# Patient Record
Sex: MALE | Race: WHITE | NOT HISPANIC OR LATINO | Employment: FULL TIME | URBAN - METROPOLITAN AREA
[De-identification: names, ages, dates, MRNs, and addresses within clinical notes are randomized per-mention and may not be internally consistent; named-entity substitution may affect disease eponyms.]

---

## 2019-11-20 ENCOUNTER — APPOINTMENT (OUTPATIENT)
Dept: URGENT CARE | Age: 59
End: 2019-11-20
Payer: OTHER MISCELLANEOUS

## 2019-11-20 PROCEDURE — G0382 LEV 3 HOSP TYPE B ED VISIT: HCPCS | Performed by: PHYSICIAN ASSISTANT

## 2019-11-20 PROCEDURE — 99283 EMERGENCY DEPT VISIT LOW MDM: CPT | Performed by: PHYSICIAN ASSISTANT

## 2022-03-02 ENCOUNTER — APPOINTMENT (EMERGENCY)
Dept: RADIOLOGY | Facility: HOSPITAL | Age: 62
DRG: 247 | End: 2022-03-02
Payer: COMMERCIAL

## 2022-03-02 ENCOUNTER — HOSPITAL ENCOUNTER (INPATIENT)
Facility: HOSPITAL | Age: 62
LOS: 2 days | Discharge: HOME | DRG: 247 | End: 2022-03-04
Attending: EMERGENCY MEDICINE | Admitting: INTERNAL MEDICINE
Payer: COMMERCIAL

## 2022-03-02 DIAGNOSIS — Z95.5 S/P DRUG ELUTING CORONARY STENT PLACEMENT: ICD-10-CM

## 2022-03-02 DIAGNOSIS — I25.10 CAD (CORONARY ARTERY DISEASE): ICD-10-CM

## 2022-03-02 DIAGNOSIS — I21.19 ACUTE MI, INFERIOR WALL (HCC): Primary | ICD-10-CM

## 2022-03-02 DIAGNOSIS — I21.3 STEMI (ST ELEVATION MYOCARDIAL INFARCTION) (HCC): ICD-10-CM

## 2022-03-02 PROBLEM — E87.6 HYPOKALEMIA: Status: ACTIVE | Noted: 2022-03-02

## 2022-03-02 PROBLEM — R73.9 HYPERGLYCEMIA: Status: ACTIVE | Noted: 2022-03-02

## 2022-03-02 PROBLEM — D72.829 LEUKOCYTOSIS: Status: ACTIVE | Noted: 2022-03-02

## 2022-03-02 PROBLEM — E78.5 HLD (HYPERLIPIDEMIA): Status: ACTIVE | Noted: 2022-03-02

## 2022-03-02 PROBLEM — I10 HYPERTENSION: Status: ACTIVE | Noted: 2022-03-02

## 2022-03-02 PROBLEM — I21.11 ST ELEVATION MYOCARDIAL INFARCTION INVOLVING RIGHT CORONARY ARTERY (HCC): Status: ACTIVE | Noted: 2022-03-02

## 2022-03-02 LAB
2HR DELTA HS TROPONIN: 60 NG/L
ALBUMIN SERPL BCP-MCNC: 3.9 G/DL (ref 3.5–5)
ALP SERPL-CCNC: 88 U/L (ref 46–116)
ALT SERPL W P-5'-P-CCNC: 33 U/L (ref 12–78)
ANION GAP SERPL CALCULATED.3IONS-SCNC: 17 MMOL/L (ref 4–13)
APTT PPP: 31 SECONDS (ref 23–37)
AST SERPL W P-5'-P-CCNC: 19 U/L (ref 5–45)
BASOPHILS # BLD AUTO: 0.06 THOUSANDS/ΜL (ref 0–0.1)
BASOPHILS NFR BLD AUTO: 0 % (ref 0–1)
BILIRUB SERPL-MCNC: 0.93 MG/DL (ref 0.2–1)
BUN SERPL-MCNC: 16 MG/DL (ref 5–25)
CALCIUM SERPL-MCNC: 8.9 MG/DL (ref 8.3–10.1)
CARDIAC TROPONIN I PNL SERPL HS: 12 NG/L
CARDIAC TROPONIN I PNL SERPL HS: 72 NG/L
CHLORIDE SERPL-SCNC: 102 MMOL/L (ref 100–108)
CHOLEST SERPL-MCNC: 159 MG/DL
CO2 SERPL-SCNC: 20 MMOL/L (ref 21–32)
CREAT SERPL-MCNC: 1.17 MG/DL (ref 0.6–1.3)
EOSINOPHIL # BLD AUTO: 0.02 THOUSAND/ΜL (ref 0–0.61)
EOSINOPHIL NFR BLD AUTO: 0 % (ref 0–6)
ERYTHROCYTE [DISTWIDTH] IN BLOOD BY AUTOMATED COUNT: 12.2 % (ref 11.6–15.1)
GFR SERPL CREATININE-BSD FRML MDRD: 66 ML/MIN/1.73SQ M
GLUCOSE SERPL-MCNC: 165 MG/DL (ref 65–140)
HCT VFR BLD AUTO: 48.3 % (ref 36.5–49.3)
HDLC SERPL-MCNC: 45 MG/DL
HGB BLD-MCNC: 17 G/DL (ref 12–17)
IMM GRANULOCYTES # BLD AUTO: 0.05 THOUSAND/UL (ref 0–0.2)
IMM GRANULOCYTES NFR BLD AUTO: 0 % (ref 0–2)
INR PPP: 1 (ref 0.84–1.19)
KCT BLD-ACNC: 265 SEC (ref 89–137)
KCT BLD-ACNC: 285 SEC (ref 89–137)
LDLC SERPL CALC-MCNC: 95 MG/DL (ref 0–100)
LIPASE SERPL-CCNC: 38 U/L (ref 73–393)
LYMPHOCYTES # BLD AUTO: 2.36 THOUSANDS/ΜL (ref 0.6–4.47)
LYMPHOCYTES NFR BLD AUTO: 17 % (ref 14–44)
MAGNESIUM SERPL-MCNC: 2.1 MG/DL (ref 1.6–2.6)
MCH RBC QN AUTO: 29.6 PG (ref 26.8–34.3)
MCHC RBC AUTO-ENTMCNC: 35.2 G/DL (ref 31.4–37.4)
MCV RBC AUTO: 84 FL (ref 82–98)
MONOCYTES # BLD AUTO: 0.71 THOUSAND/ΜL (ref 0.17–1.22)
MONOCYTES NFR BLD AUTO: 5 % (ref 4–12)
NEUTROPHILS # BLD AUTO: 10.69 THOUSANDS/ΜL (ref 1.85–7.62)
NEUTS SEG NFR BLD AUTO: 78 % (ref 43–75)
NONHDLC SERPL-MCNC: 114 MG/DL
NRBC BLD AUTO-RTO: 0 /100 WBCS
PLATELET # BLD AUTO: 355 THOUSANDS/UL (ref 149–390)
PMV BLD AUTO: 9.6 FL (ref 8.9–12.7)
POTASSIUM SERPL-SCNC: 3.3 MMOL/L (ref 3.5–5.3)
PROT SERPL-MCNC: 8.2 G/DL (ref 6.4–8.2)
PROTHROMBIN TIME: 13.2 SECONDS (ref 11.6–14.5)
RBC # BLD AUTO: 5.74 MILLION/UL (ref 3.88–5.62)
SODIUM SERPL-SCNC: 139 MMOL/L (ref 136–145)
SPECIMEN SOURCE: ABNORMAL
SPECIMEN SOURCE: ABNORMAL
TRIGL SERPL-MCNC: 96 MG/DL
WBC # BLD AUTO: 13.89 THOUSAND/UL (ref 4.31–10.16)

## 2022-03-02 PROCEDURE — 99285 EMERGENCY DEPT VISIT HI MDM: CPT

## 2022-03-02 PROCEDURE — 83735 ASSAY OF MAGNESIUM: CPT

## 2022-03-02 PROCEDURE — NC001 PR NO CHARGE: Performed by: INTERNAL MEDICINE

## 2022-03-02 PROCEDURE — 99153 MOD SED SAME PHYS/QHP EA: CPT | Performed by: INTERNAL MEDICINE

## 2022-03-02 PROCEDURE — 83690 ASSAY OF LIPASE: CPT | Performed by: EMERGENCY MEDICINE

## 2022-03-02 PROCEDURE — C1894 INTRO/SHEATH, NON-LASER: HCPCS | Performed by: INTERNAL MEDICINE

## 2022-03-02 PROCEDURE — 71045 X-RAY EXAM CHEST 1 VIEW: CPT

## 2022-03-02 PROCEDURE — C1769 GUIDE WIRE: HCPCS | Performed by: INTERNAL MEDICINE

## 2022-03-02 PROCEDURE — B2111ZZ FLUOROSCOPY OF MULTIPLE CORONARY ARTERIES USING LOW OSMOLAR CONTRAST: ICD-10-PCS | Performed by: INTERNAL MEDICINE

## 2022-03-02 PROCEDURE — 85025 COMPLETE CBC W/AUTO DIFF WBC: CPT | Performed by: EMERGENCY MEDICINE

## 2022-03-02 PROCEDURE — 85730 THROMBOPLASTIN TIME PARTIAL: CPT

## 2022-03-02 PROCEDURE — 85610 PROTHROMBIN TIME: CPT

## 2022-03-02 PROCEDURE — 027034Z DILATION OF CORONARY ARTERY, ONE ARTERY WITH DRUG-ELUTING INTRALUMINAL DEVICE, PERCUTANEOUS APPROACH: ICD-10-PCS | Performed by: INTERNAL MEDICINE

## 2022-03-02 PROCEDURE — 99223 1ST HOSP IP/OBS HIGH 75: CPT | Performed by: NURSE PRACTITIONER

## 2022-03-02 PROCEDURE — 80053 COMPREHEN METABOLIC PANEL: CPT | Performed by: EMERGENCY MEDICINE

## 2022-03-02 PROCEDURE — C1725 CATH, TRANSLUMIN NON-LASER: HCPCS | Performed by: INTERNAL MEDICINE

## 2022-03-02 PROCEDURE — 96365 THER/PROPH/DIAG IV INF INIT: CPT

## 2022-03-02 PROCEDURE — 99291 CRITICAL CARE FIRST HOUR: CPT | Performed by: EMERGENCY MEDICINE

## 2022-03-02 PROCEDURE — 92941 PRQ TRLML REVSC TOT OCCL AMI: CPT | Performed by: INTERNAL MEDICINE

## 2022-03-02 PROCEDURE — 99152 MOD SED SAME PHYS/QHP 5/>YRS: CPT | Performed by: INTERNAL MEDICINE

## 2022-03-02 PROCEDURE — C1887 CATHETER, GUIDING: HCPCS | Performed by: INTERNAL MEDICINE

## 2022-03-02 PROCEDURE — 84484 ASSAY OF TROPONIN QUANT: CPT | Performed by: EMERGENCY MEDICINE

## 2022-03-02 PROCEDURE — 93454 CORONARY ARTERY ANGIO S&I: CPT | Performed by: INTERNAL MEDICINE

## 2022-03-02 PROCEDURE — 85347 COAGULATION TIME ACTIVATED: CPT

## 2022-03-02 PROCEDURE — 36415 COLL VENOUS BLD VENIPUNCTURE: CPT

## 2022-03-02 PROCEDURE — 96375 TX/PRO/DX INJ NEW DRUG ADDON: CPT

## 2022-03-02 PROCEDURE — 80061 LIPID PANEL: CPT

## 2022-03-02 PROCEDURE — C1874 STENT, COATED/COV W/DEL SYS: HCPCS | Performed by: INTERNAL MEDICINE

## 2022-03-02 PROCEDURE — 93005 ELECTROCARDIOGRAM TRACING: CPT

## 2022-03-02 DEVICE — XIENCE SKYPOINT™ EVEROLIMUS ELUTING CORONARY STENT SYSTEM 4.00 MM X 23 MM / RAPID-EXCHANGE
Type: IMPLANTABLE DEVICE | Site: CORONARY | Status: FUNCTIONAL
Brand: XIENCE SKYPOINT™

## 2022-03-02 RX ORDER — ASPIRIN 81 MG/1
81 TABLET, CHEWABLE ORAL DAILY
Status: DISCONTINUED | OUTPATIENT
Start: 2022-03-03 | End: 2022-03-04 | Stop reason: HOSPADM

## 2022-03-02 RX ORDER — ASPIRIN 81 MG/1
324 TABLET, CHEWABLE ORAL ONCE
Status: COMPLETED | OUTPATIENT
Start: 2022-03-02 | End: 2022-03-02

## 2022-03-02 RX ORDER — MIDAZOLAM HYDROCHLORIDE 2 MG/2ML
INJECTION, SOLUTION INTRAMUSCULAR; INTRAVENOUS AS NEEDED
Status: DISCONTINUED | OUTPATIENT
Start: 2022-03-02 | End: 2022-03-02 | Stop reason: HOSPADM

## 2022-03-02 RX ORDER — HEPARIN SODIUM 5000 [USP'U]/ML
5000 INJECTION, SOLUTION INTRAVENOUS; SUBCUTANEOUS EVERY 8 HOURS SCHEDULED
Status: DISCONTINUED | OUTPATIENT
Start: 2022-03-03 | End: 2022-03-04 | Stop reason: HOSPADM

## 2022-03-02 RX ORDER — LISINOPRIL 40 MG/1
40 TABLET ORAL DAILY
COMMUNITY
End: 2022-03-10 | Stop reason: SDUPTHER

## 2022-03-02 RX ORDER — POTASSIUM CHLORIDE 20 MEQ/1
40 TABLET, EXTENDED RELEASE ORAL ONCE
Status: COMPLETED | OUTPATIENT
Start: 2022-03-02 | End: 2022-03-02

## 2022-03-02 RX ORDER — NITROGLYCERIN 20 MG/100ML
INJECTION INTRAVENOUS AS NEEDED
Status: DISCONTINUED | OUTPATIENT
Start: 2022-03-02 | End: 2022-03-02 | Stop reason: HOSPADM

## 2022-03-02 RX ORDER — ONDANSETRON 2 MG/ML
4 INJECTION INTRAMUSCULAR; INTRAVENOUS ONCE
Status: COMPLETED | OUTPATIENT
Start: 2022-03-02 | End: 2022-03-02

## 2022-03-02 RX ORDER — HEPARIN SODIUM 1000 [USP'U]/ML
INJECTION, SOLUTION INTRAVENOUS; SUBCUTANEOUS AS NEEDED
Status: DISCONTINUED | OUTPATIENT
Start: 2022-03-02 | End: 2022-03-02 | Stop reason: HOSPADM

## 2022-03-02 RX ORDER — HEPARIN SODIUM 1000 [USP'U]/ML
4000 INJECTION, SOLUTION INTRAVENOUS; SUBCUTANEOUS ONCE
Status: COMPLETED | OUTPATIENT
Start: 2022-03-02 | End: 2022-03-02

## 2022-03-02 RX ORDER — ASPIRIN 81 MG/1
324 TABLET, CHEWABLE ORAL ONCE
Status: DISCONTINUED | OUTPATIENT
Start: 2022-03-02 | End: 2022-03-02

## 2022-03-02 RX ORDER — POTASSIUM CHLORIDE 14.9 MG/ML
20 INJECTION INTRAVENOUS ONCE
Status: COMPLETED | OUTPATIENT
Start: 2022-03-02 | End: 2022-03-03

## 2022-03-02 RX ORDER — VERAPAMIL HCL 2.5 MG/ML
AMPUL (ML) INTRAVENOUS AS NEEDED
Status: DISCONTINUED | OUTPATIENT
Start: 2022-03-02 | End: 2022-03-02 | Stop reason: HOSPADM

## 2022-03-02 RX ORDER — NITROGLYCERIN 0.4 MG/1
0.4 TABLET SUBLINGUAL
Status: DISCONTINUED | OUTPATIENT
Start: 2022-03-02 | End: 2022-03-04 | Stop reason: HOSPADM

## 2022-03-02 RX ORDER — METOPROLOL TARTRATE 5 MG/5ML
INJECTION INTRAVENOUS AS NEEDED
Status: DISCONTINUED | OUTPATIENT
Start: 2022-03-02 | End: 2022-03-02 | Stop reason: HOSPADM

## 2022-03-02 RX ORDER — SODIUM CHLORIDE 9 MG/ML
100 INJECTION, SOLUTION INTRAVENOUS CONTINUOUS
Status: DISCONTINUED | OUTPATIENT
Start: 2022-03-02 | End: 2022-03-03 | Stop reason: ALTCHOICE

## 2022-03-02 RX ORDER — ASPIRIN 81 MG/1
TABLET, CHEWABLE ORAL
Status: COMPLETED
Start: 2022-03-02 | End: 2022-03-02

## 2022-03-02 RX ORDER — FENTANYL CITRATE 50 UG/ML
INJECTION, SOLUTION INTRAMUSCULAR; INTRAVENOUS AS NEEDED
Status: DISCONTINUED | OUTPATIENT
Start: 2022-03-02 | End: 2022-03-02 | Stop reason: HOSPADM

## 2022-03-02 RX ORDER — LISINOPRIL 10 MG/1
10 TABLET ORAL DAILY
COMMUNITY
End: 2022-03-04 | Stop reason: HOSPADM

## 2022-03-02 RX ORDER — LIDOCAINE HYDROCHLORIDE 10 MG/ML
INJECTION, SOLUTION EPIDURAL; INFILTRATION; INTRACAUDAL; PERINEURAL AS NEEDED
Status: DISCONTINUED | OUTPATIENT
Start: 2022-03-02 | End: 2022-03-02 | Stop reason: HOSPADM

## 2022-03-02 RX ORDER — ONDANSETRON 2 MG/ML
INJECTION INTRAMUSCULAR; INTRAVENOUS
Status: COMPLETED
Start: 2022-03-02 | End: 2022-03-02

## 2022-03-02 RX ORDER — SODIUM CHLORIDE 9 MG/ML
3 INJECTION INTRAVENOUS
Status: DISCONTINUED | OUTPATIENT
Start: 2022-03-02 | End: 2022-03-04 | Stop reason: HOSPADM

## 2022-03-02 RX ORDER — ATORVASTATIN CALCIUM 40 MG/1
80 TABLET, FILM COATED ORAL EVERY EVENING
Status: DISCONTINUED | OUTPATIENT
Start: 2022-03-02 | End: 2022-03-04 | Stop reason: HOSPADM

## 2022-03-02 RX ADMIN — NITROGLYCERIN 1 INCH: 20 OINTMENT TOPICAL at 20:42

## 2022-03-02 RX ADMIN — TICAGRELOR 180 MG: 90 TABLET ORAL at 20:14

## 2022-03-02 RX ADMIN — POTASSIUM CHLORIDE 20 MEQ: 200 INJECTION, SOLUTION INTRAVENOUS at 23:35

## 2022-03-02 RX ADMIN — NITROGLYCERIN 0.4 MG: 0.4 TABLET SUBLINGUAL at 20:21

## 2022-03-02 RX ADMIN — NITROGLYCERIN 0.4 MG: 0.4 TABLET SUBLINGUAL at 20:33

## 2022-03-02 RX ADMIN — ONDANSETRON: 2 INJECTION INTRAMUSCULAR; INTRAVENOUS at 20:07

## 2022-03-02 RX ADMIN — NITROGLYCERIN 0.4 MG: 0.4 TABLET SUBLINGUAL at 20:10

## 2022-03-02 RX ADMIN — ATORVASTATIN CALCIUM 80 MG: 40 TABLET, FILM COATED ORAL at 23:17

## 2022-03-02 RX ADMIN — HEPARIN SODIUM 4000 UNITS: 1000 INJECTION INTRAVENOUS; SUBCUTANEOUS at 20:16

## 2022-03-02 RX ADMIN — ASPIRIN 324 MG: 81 TABLET, CHEWABLE ORAL at 20:07

## 2022-03-02 RX ADMIN — POTASSIUM CHLORIDE 40 MEQ: 1500 TABLET, EXTENDED RELEASE ORAL at 23:17

## 2022-03-02 NOTE — LETTER
[place]  [facility] [address]  Dept: [phone]    March 4, 2022     Patient: Arielle Wilson   YOB: 1960   Date of Visit: 3/2/2022       To Whom it May Concern:    Dennise Pena is under my professional care  He was seen in the hospital from 3/2/2022   to 03/04/22  He may return to work on 3/11/2022 without limitations  If you have any questions or concerns, please don't hesitate to call        Sincerely,        ISRAEL Mike

## 2022-03-03 ENCOUNTER — APPOINTMENT (INPATIENT)
Dept: NON INVASIVE DIAGNOSTICS | Facility: HOSPITAL | Age: 62
DRG: 247 | End: 2022-03-03
Payer: COMMERCIAL

## 2022-03-03 LAB
4HR DELTA HS TROPONIN: 316 NG/L
ANION GAP SERPL CALCULATED.3IONS-SCNC: 13 MMOL/L (ref 4–13)
ANION GAP SERPL CALCULATED.3IONS-SCNC: 9 MMOL/L (ref 4–13)
AORTIC ROOT: 3.5 CM
APICAL FOUR CHAMBER EJECTION FRACTION: 75 %
ASCENDING AORTA: 3.1 CM (ref 2.13–3.18)
BASOPHILS # BLD AUTO: 0.04 THOUSANDS/ΜL (ref 0–0.1)
BASOPHILS NFR BLD AUTO: 0 % (ref 0–1)
BUN SERPL-MCNC: 13 MG/DL (ref 5–25)
BUN SERPL-MCNC: 14 MG/DL (ref 5–25)
CALCIUM SERPL-MCNC: 8.2 MG/DL (ref 8.3–10.1)
CALCIUM SERPL-MCNC: 8.2 MG/DL (ref 8.3–10.1)
CARDIAC TROPONIN I PNL SERPL HS: 328 NG/L
CHLORIDE SERPL-SCNC: 108 MMOL/L (ref 100–108)
CHLORIDE SERPL-SCNC: 108 MMOL/L (ref 100–108)
CO2 SERPL-SCNC: 21 MMOL/L (ref 21–32)
CO2 SERPL-SCNC: 22 MMOL/L (ref 21–32)
CREAT SERPL-MCNC: 0.77 MG/DL (ref 0.6–1.3)
CREAT SERPL-MCNC: 0.84 MG/DL (ref 0.6–1.3)
E WAVE DECELERATION TIME: 268 MS
EOSINOPHIL # BLD AUTO: 0.04 THOUSAND/ΜL (ref 0–0.61)
EOSINOPHIL NFR BLD AUTO: 0 % (ref 0–6)
ERYTHROCYTE [DISTWIDTH] IN BLOOD BY AUTOMATED COUNT: 12.2 % (ref 11.6–15.1)
EST. AVERAGE GLUCOSE BLD GHB EST-MCNC: 114 MG/DL
FRACTIONAL SHORTENING: 35 % (ref 28–44)
GFR SERPL CREATININE-BSD FRML MDRD: 94 ML/MIN/1.73SQ M
GFR SERPL CREATININE-BSD FRML MDRD: 97 ML/MIN/1.73SQ M
GLUCOSE SERPL-MCNC: 101 MG/DL (ref 65–140)
GLUCOSE SERPL-MCNC: 109 MG/DL (ref 65–140)
HBA1C MFR BLD: 5.6 %
HCT VFR BLD AUTO: 42.1 % (ref 36.5–49.3)
HGB BLD-MCNC: 15.3 G/DL (ref 12–17)
IMM GRANULOCYTES # BLD AUTO: 0.08 THOUSAND/UL (ref 0–0.2)
IMM GRANULOCYTES NFR BLD AUTO: 1 % (ref 0–2)
INTERVENTRICULAR SEPTUM IN DIASTOLE (PARASTERNAL SHORT AXIS VIEW): 0.9 CM
INTERVENTRICULAR SEPTUM: 0.9 CM (ref 0.55–1.04)
LAAS-AP2: 13.2 CM2
LAAS-AP4: 12.7 CM2
LEFT ATRIUM SIZE: 3.5 CM
LEFT INTERNAL DIMENSION IN SYSTOLE: 3.5 CM (ref 3.71–5.62)
LEFT VENTRICULAR INTERNAL DIMENSION IN DIASTOLE: 5.4 CM (ref 6.16–9.17)
LEFT VENTRICULAR POSTERIOR WALL IN END DIASTOLE: 0.8 CM (ref 0.54–1.02)
LEFT VENTRICULAR STROKE VOLUME: 93 ML
LVSV (TEICH): 93 ML
LYMPHOCYTES # BLD AUTO: 2.58 THOUSANDS/ΜL (ref 0.6–4.47)
LYMPHOCYTES NFR BLD AUTO: 21 % (ref 14–44)
MAGNESIUM SERPL-MCNC: 2.2 MG/DL (ref 1.6–2.6)
MCH RBC QN AUTO: 30.8 PG (ref 26.8–34.3)
MCHC RBC AUTO-ENTMCNC: 36.3 G/DL (ref 31.4–37.4)
MCV RBC AUTO: 85 FL (ref 82–98)
MONOCYTES # BLD AUTO: 1.3 THOUSAND/ΜL (ref 0.17–1.22)
MONOCYTES NFR BLD AUTO: 11 % (ref 4–12)
MV E'TISSUE VEL-SEP: 8 CM/S
MV PEAK A VEL: 0.93 M/S
MV PEAK E VEL: 77 CM/S
MV STENOSIS PRESSURE HALF TIME: 78 MS
MV VALVE AREA P 1/2 METHOD: 2.82 CM2
NEUTROPHILS # BLD AUTO: 8.37 THOUSANDS/ΜL (ref 1.85–7.62)
NEUTS SEG NFR BLD AUTO: 67 % (ref 43–75)
NRBC BLD AUTO-RTO: 0 /100 WBCS
PLATELET # BLD AUTO: 299 THOUSANDS/UL (ref 149–390)
PMV BLD AUTO: 9.4 FL (ref 8.9–12.7)
POTASSIUM SERPL-SCNC: 3.7 MMOL/L (ref 3.5–5.3)
POTASSIUM SERPL-SCNC: 3.9 MMOL/L (ref 3.5–5.3)
RBC # BLD AUTO: 4.97 MILLION/UL (ref 3.88–5.62)
RIGHT ATRIUM AREA SYSTOLE A4C: 15.1 CM2
RIGHT VENTRICLE ID DIMENSION: 4.1 CM
SL CV LEFT ATRIUM LENGTH A2C: 4.4 CM
SL CV LV EF: 65
SL CV PED ECHO LEFT VENTRICLE DIASTOLIC VOLUME (MOD BIPLANE) 2D: 144 ML
SL CV PED ECHO LEFT VENTRICLE SYSTOLIC VOLUME (MOD BIPLANE) 2D: 51 ML
SODIUM SERPL-SCNC: 139 MMOL/L (ref 136–145)
SODIUM SERPL-SCNC: 142 MMOL/L (ref 136–145)
TR MAX PG: 17 MMHG
TR PEAK VELOCITY: 2.1 M/S
TRICUSPID VALVE PEAK REGURGITATION VELOCITY: 2.06 M/S
WBC # BLD AUTO: 12.41 THOUSAND/UL (ref 4.31–10.16)
Z-SCORE OF ASCENDING AORTA: 1.67
Z-SCORE OF INTERVENTRICULAR SEPTUM IN END DIASTOLE: 0.85
Z-SCORE OF LEFT VENTRICULAR DIMENSION IN END DIASTOLE: -3.3
Z-SCORE OF LEFT VENTRICULAR DIMENSION IN END SYSTOLE: -2.1
Z-SCORE OF LEFT VENTRICULAR POSTERIOR WALL IN END DIASTOLE: 0.15

## 2022-03-03 PROCEDURE — 85025 COMPLETE CBC W/AUTO DIFF WBC: CPT | Performed by: NURSE PRACTITIONER

## 2022-03-03 PROCEDURE — 83735 ASSAY OF MAGNESIUM: CPT | Performed by: NURSE PRACTITIONER

## 2022-03-03 PROCEDURE — 99232 SBSQ HOSP IP/OBS MODERATE 35: CPT | Performed by: INTERNAL MEDICINE

## 2022-03-03 PROCEDURE — 93306 TTE W/DOPPLER COMPLETE: CPT | Performed by: INTERNAL MEDICINE

## 2022-03-03 PROCEDURE — 80048 BASIC METABOLIC PNL TOTAL CA: CPT | Performed by: INTERNAL MEDICINE

## 2022-03-03 PROCEDURE — 83036 HEMOGLOBIN GLYCOSYLATED A1C: CPT | Performed by: NURSE PRACTITIONER

## 2022-03-03 PROCEDURE — 80048 BASIC METABOLIC PNL TOTAL CA: CPT | Performed by: NURSE PRACTITIONER

## 2022-03-03 PROCEDURE — 99233 SBSQ HOSP IP/OBS HIGH 50: CPT | Performed by: INTERNAL MEDICINE

## 2022-03-03 PROCEDURE — 93005 ELECTROCARDIOGRAM TRACING: CPT

## 2022-03-03 PROCEDURE — 93306 TTE W/DOPPLER COMPLETE: CPT

## 2022-03-03 RX ORDER — ONDANSETRON 2 MG/ML
4 INJECTION INTRAMUSCULAR; INTRAVENOUS EVERY 6 HOURS PRN
Status: DISCONTINUED | OUTPATIENT
Start: 2022-03-03 | End: 2022-03-04 | Stop reason: HOSPADM

## 2022-03-03 RX ORDER — METOPROLOL TARTRATE 50 MG/1
50 TABLET, FILM COATED ORAL EVERY 12 HOURS SCHEDULED
Status: DISCONTINUED | OUTPATIENT
Start: 2022-03-03 | End: 2022-03-04 | Stop reason: HOSPADM

## 2022-03-03 RX ORDER — LISINOPRIL 20 MG/1
40 TABLET ORAL DAILY
Status: DISCONTINUED | OUTPATIENT
Start: 2022-03-03 | End: 2022-03-04 | Stop reason: HOSPADM

## 2022-03-03 RX ORDER — METOCLOPRAMIDE HYDROCHLORIDE 5 MG/ML
10 INJECTION INTRAMUSCULAR; INTRAVENOUS ONCE
Status: COMPLETED | OUTPATIENT
Start: 2022-03-03 | End: 2022-03-03

## 2022-03-03 RX ADMIN — TICAGRELOR 90 MG: 90 TABLET ORAL at 20:59

## 2022-03-03 RX ADMIN — ATORVASTATIN CALCIUM 80 MG: 40 TABLET, FILM COATED ORAL at 17:25

## 2022-03-03 RX ADMIN — LISINOPRIL 40 MG: 20 TABLET ORAL at 22:38

## 2022-03-03 RX ADMIN — ONDANSETRON 4 MG: 2 INJECTION INTRAMUSCULAR; INTRAVENOUS at 14:34

## 2022-03-03 RX ADMIN — METOPROLOL TARTRATE 50 MG: 50 TABLET, FILM COATED ORAL at 11:53

## 2022-03-03 RX ADMIN — SODIUM CHLORIDE 100 ML/HR: 0.9 INJECTION, SOLUTION INTRAVENOUS at 03:27

## 2022-03-03 RX ADMIN — HEPARIN SODIUM 5000 UNITS: 5000 INJECTION INTRAVENOUS; SUBCUTANEOUS at 21:04

## 2022-03-03 RX ADMIN — METOCLOPRAMIDE HYDROCHLORIDE 10 MG: 5 INJECTION INTRAMUSCULAR; INTRAVENOUS at 16:42

## 2022-03-03 RX ADMIN — TICAGRELOR 90 MG: 90 TABLET ORAL at 08:37

## 2022-03-03 RX ADMIN — HEPARIN SODIUM 5000 UNITS: 5000 INJECTION INTRAVENOUS; SUBCUTANEOUS at 14:21

## 2022-03-03 RX ADMIN — ASPIRIN 81 MG CHEWABLE TABLET 81 MG: 81 TABLET CHEWABLE at 08:37

## 2022-03-03 RX ADMIN — METOPROLOL TARTRATE 50 MG: 50 TABLET, FILM COATED ORAL at 21:07

## 2022-03-03 RX ADMIN — HEPARIN SODIUM 5000 UNITS: 5000 INJECTION INTRAVENOUS; SUBCUTANEOUS at 06:56

## 2022-03-03 NOTE — PROGRESS NOTES
Patient to 228 awake and alert on monitor with RN at bedside  TR band maintained to right wrist, C/D/I, no bleeding or swelling noted  RN Darleen at bedside to assume care and verify site check

## 2022-03-03 NOTE — DISCHARGE INSTRUCTIONS
1  Please see the post angioplasty discharge instructions  No heavy lifting, greater than 10 lbs  or strenuous activity for 1 week  Follow angioplasty discharge instructions  2 Remove band aid tomorrow  Shower and wash area- wrist gently with soap and water- beginning tomorrow  Rinse and pat dry  Apply new water seal band aid  Repeat this process for 5 days  No powders, creams lotions or antibiotic ointments  for 5 days  No tub baths, hot tubs or swimming for 5 days  3  Call [facility] ([phone]) if you develop a fever, redness or drainage at your wrist access site  4  No driving for 2 days    5  Do not stop aspirin or Brilinta (Ticagrelor) any reason without a cardiologists consent, or the stent could block up and cause a heart attack  6  Stent card and book  Coronary Intravascular Stent Placement   WHAT YOU SHOULD KNOW:   Coronary intravascular stent placement is a procedure to place a stent in an artery of your heart that has plaque buildup  Plaque is a mixture of fat and cholesterol  A stent is a small mesh tube made of metal that helps keep your artery open  Your caregiver may place a bare metal stent or a drug-eluting stent (ITZEL) in your artery  A ITZEL is coated with medicine that is slowly released and helps prevent more plaque buildup in the area where the stent is placed  The stent remains in your artery for life  You may need more than one stent  CARE AGREEMENT:   You have the right to help plan your care  Learn about your health condition and how it may be treated  Discuss treatment options with your caregivers to decide what care you want to receive  You always have the right to refuse treatment  RISKS:   · You may develop a hematoma (swelling caused by collection of blood) or bleed more than expected from your catheter site  The dye used during this procedure may cause an allergic reaction or kidney problems  You may develop an infection       · Your artery may be injured when the catheter is inserted  During or after surgery, blood clots may form, or plaque may break off  The blood clot or plaque may block your artery and cause a heart attack or stroke  The stent could collapse, or a clot could form on the stent  This could cause the artery to become blocked again  You may need another procedure to open your artery  If you do not have this procedure, plaque may continue to build up in your arteries  This can cause a heart attack or stroke  WHILE YOU ARE HERE:   Before the procedure:   · Informed consent  is a legal document that explains the tests, treatments, or procedures that you may need  Informed consent means you understand what will be done and can make decisions about what you want  You give your permission when you sign the consent form  You can have someone sign this form for you if you are not able to sign it  You have the right to understand your medical care in words you know  Before you sign the consent form, understand the risks and benefits of what will be done  Make sure all your questions are answered  · Blood tests  are done to give caregivers information about how your body is working  The blood may be taken from your hand, arm, or IV  · Heart monitoring  is also called an ECG or EKG  Sticky pads placed on your skin record your heart's electrical activity  · An IV  is a small tube placed in your vein that is used to give you medicine or liquids  · A sedative  will be given to you right before the procedure  This medicine may make you feel sleepy and more relaxed  · Blood thinner medicine  will be given to prevent clots from forming during and after the procedure  During the procedure:   · You will receive local anesthesia that will numb the area where the catheter will be placed  You will be awake during the procedure so that your caregivers can give you instructions   You may be asked to cough or hold your breath during the procedure  · A catheter (long, thin tube) is put into an artery, usually in your groin  The catheter is gently guided through this artery to your heart and into the narrowed or blocked artery  Caregivers will use x-rays and dye to find the area where the stent needs to be placed  You may feel warm as the dye is put into the catheter  A guidewire is then placed into the catheter  The balloon catheter is guided into the narrow or blocked artery using the guidewire  Caregivers inflate the balloon several times for short periods  The inflated balloon pushes the plaque against the artery walls  This opens them and allows more blood flow to your heart  Another balloon catheter with a stent is then inserted into the artery  The balloon is inflated  This expands the stent and pushes it into place against the artery wall  The stent will be left in your artery to help keep it open  After the procedure: The catheter and guidewire will be taken out of the artery and a pressure bandage will be put on the area  Your caregiver may apply a collagen plug or other closure device to stop the bleeding  Caregivers will put pressure on the bandaged area to help stop bleeding  They may use their fingers or a mechanical device to apply the pressure  You will be taken to a room to rest  Caregivers will monitor you closely for any problems  When your caregiver sees that you are okay, you will be taken to your hospital room  You may need to lie still and flat for 3 to 6 hours after the procedure to prevent bleeding  Do not get out of bed  until your caregiver says it is okay  © 2014 [employer] is for End User's use only and may not be sold, redistributed or otherwise used for commercial purposes  All illustrations and images included in CareNotes® are the copyrighted property of [employer]  or Brenden Lima  The above information is an  only   It is not intended as medical advice for individual conditions or treatments  Talk to your doctor, nurse or pharmacist before following any medical regimen to see if it is safe and effective for you  Heart Attack   WHAT YOU NEED TO KNOW:   A heart attack happens when the blood vessels that supply blood to your heart are blocked  This can damage your heart or lead to an abnormal heart rhythm or heart failure  A heart attack is also called a myocardial infarction  DISCHARGE INSTRUCTIONS:   Call your local emergency number (911 in the [address] [facility]) for any of the following:   · You have any of the following signs of a heart attack:      ? Squeezing, pressure, or pain in your chest    ? You may  also have any of the following:     § Discomfort or pain in your back, neck, jaw, stomach, or arm    § Shortness of breath    § Nausea or vomiting    § Lightheadedness or a sudden cold sweat      Seek care immediately if:   · You are tired and cannot think clearly  · Your heart is beating faster than usual     · You are bleeding from your gums or nose  · You see blood in your urine or bowel movements  · You urinate less than usual or not at all  · You have new or increased swelling in your feet or ankles  Call your doctor or cardiologist if:   · You have trouble taking your heart medicine  · You have questions or concerns about your condition or care  Medicines: You may  need any of the following:  · Heart medicines  help decrease blood pressure, control your heart rate, and help your heart function better  · Nitroglycerin  opens the arteries to your heart, increases oxygen levels, and can decrease chest pain  You may get your nitroglycerin as a pill, a patch, or a paste  Ask your healthcare provider or cardiologist how to safely take this medicine  · Aspirin  helps prevent clots from forming and causing blood flow problems  If healthcare providers want you to take aspirin daily, do not take acetaminophen or ibuprofen instead   Do not take more or less aspirin than healthcare providers say to take  If you are on another blood thinner medicine, ask your healthcare provider or cardiologist before you take aspirin for any reason  · Antiplatelets , such as aspirin, help prevent blood clots  Take your antiplatelet medicine exactly as directed  These medicines make it more likely for you to bleed or bruise  If you are told to take aspirin, do not take acetaminophen or ibuprofen instead  · Blood thinners  help prevent blood clots  Clots can cause strokes, heart attacks, and death  The following are general safety guidelines to follow while you are taking a blood thinner:    ? Watch for bleeding and bruising while you take blood thinners  Watch for bleeding from your gums or nose  Watch for blood in your urine and bowel movements  Use a soft washcloth on your skin, and a soft toothbrush to brush your teeth  This can keep your skin and gums from bleeding  If you shave, use an electric shaver  Do not play contact sports  ? Tell your dentist and other healthcare providers that you take a blood thinner  Wear a bracelet or necklace that says you take this medicine  ? Do not start or stop any other medicines unless your healthcare provider tells you to  Many medicines cannot be used with blood thinners  ? Take your blood thinner exactly as prescribed by your healthcare provider  Do not skip does or take less than prescribed  Tell your provider right away if you forget to take your blood thinner, or if you take too much  ? Warfarin  is a blood thinner that you may need to take  The following are things you should be aware of if you take warfarin:     § Foods and medicines can affect the amount of warfarin in your blood  Do not make major changes to your diet while you take warfarin  Warfarin works best when you eat about the same amount of vitamin K every day  Vitamin K is found in green leafy vegetables and certain other foods   Ask for more information about what to eat when you are taking warfarin  § You will need to see your healthcare provider for follow-up visits when you are on warfarin  You will need regular blood tests  These tests are used to decide how much medicine you need  · Cholesterol medicine  decreases cholesterol and the amount of plaque in your blood  · Do not take certain medicines without asking your healthcare provider first   These include NSAIDs, herbal or vitamin supplements, or hormones (estrogen or progestin)  · Take your medicine as directed  Contact your healthcare provider if you think your medicine is not helping or if you have side effects  Tell him or her if you are allergic to any medicine  Keep a list of the medicines, vitamins, and herbs you take  Include the amounts, and when and why you take them  Bring the list or the pill bottles to follow-up visits  Carry your medicine list with you in case of an emergency  Cardiac rehabilitation (rehab)  is a program run by specialists who will help you safely strengthen your heart and prevent more heart disease  The plan includes exercise, relaxation, stress management, and heart-healthy nutrition  Healthcare providers will also check to make sure any medicines you take are working  The plan may also include instructions for when you can drive, return to work, and do other normal daily activities  Manage other health conditions:  Diabetes and high cholesterol increases your risk for another heart attack and stroke  Talk to your healthcare provider about your management plan  He or she will make a plan that helps you manage your conditions  Check your blood pressure at home:  High blood pressure can increase your risk for another heart attack  Sit and rest for 5 minutes before you take your blood pressure  Extend your arm and support it on a flat surface  Your arm should be at the same level as your heart   Follow the directions that came with your monitor  If possible, take at least 2 readings each time  Take your blood pressure at least 2 times each day at the same times, such as mornings and evenings  Keep a record of your readings and bring it to your follow-up visits  Ask your healthcare provider what your blood pressure should be  Get a flu vaccine every year as soon as it is available: The vaccine will help prevent the flu  A heart attack will make it harder for you to fight off the flu virus on your own  The flu may also be worse for you than for a person who has not had a heart attack  Ask about other vaccinations you may need  Lifestyle changes you may need to make after a heart attack:   · Follow a heart-healthy diet  A heart-healthy diet is an eating plan low in total fat, unhealthy fats, and sodium (salt)  A heart-healthy diet helps decrease your risk for heart disease and stroke  Limit the amount of fat you eat to 25% to 35% of your total daily calories  Your healthcare provider may recommend the DASH (Dietary Approaches to Stop Hypertension) Eating Plan to help lower high blood pressure and LDL (bad) cholesterol  The plan is low in sodium, sugar, unhealthy fats, and total fat  It is high in potassium, calcium, magnesium, and fiber  Ask for more information about this plan  · Limit sodium (salt) as directed  Too much sodium can affect your fluid balance  Check labels to find low-sodium or no-salt-added foods  Some low-sodium foods use potassium salts for flavor  Too much potassium can also cause health problems  Your healthcare provider will tell you how much sodium and potassium are safe for you to have in a day  He or she may recommend that you limit sodium to 2,300 mg a day  · Do not smoke  Nicotine and other chemicals in cigarettes and cigars can cause lung and heart damage  Ask your healthcare provider for information if you currently smoke and need help to quit   E-cigarettes or smokeless tobacco still contain nicotine  Talk to your healthcare provider before you use these products  · Exercise as directed  Ask your healthcare provider about the best exercise plan for you  Exercise makes your heart stronger, lowers blood pressure, and helps prevent a heart attack  The goal is 30 to 60 minutes a day, 5 to 7 days a week  You may have to work up to this goal  Healthcare providers can help you reach this goal, starting in cardiac rehab sessions  · Maintain a healthy weight  Ask your healthcare provider how much you should weigh  He or she can help you create a safe weight loss plan if you are overweight  · Manage stress  Stress may increase your risk for another heart attack  Learn ways to control stress, such as relaxation, deep breathing, and music  Talk to someone about things that upset you  Follow up with your healthcare provider or cardiologist within 14 days or as directed:  Write down your questions so you remember to ask them during your visits  © [employer] 2022 Information is for End User's use only and may not be sold, redistributed or otherwise used for commercial purposes  All illustrations and images included in CareNotes® are the copyrighted property of [employer]  or [facility][note 3] Isabella Dennis   The above information is an  only  It is not intended as medical advice for individual conditions or treatments  Talk to your doctor, nurse or pharmacist before following any medical regimen to see if it is safe and effective for you

## 2022-03-03 NOTE — PROGRESS NOTES
TR band completely weaned by change of shift, no further active bleeding, capillary refill good  Cardiac rhythm remained in NSR throughout the night , no chest pain, IVF continued throughout the night, pt voided about 300cc this am  Received 20meq IV K+ and 40 of po K+ for a K+ of 3 2, K+ this am is 3 3

## 2022-03-03 NOTE — ED ATTENDING ATTESTATION
3/2/2022  IChandana MD, saw and evaluated the patient  I have discussed the patient with the resident/non-physician practitioner and agree with the resident's/non-physician practitioner's findings, Plan of Care, and MDM as documented in the resident's/non-physician practitioner's note, except where noted  All available labs and Radiology studies were reviewed  I was present for key portions of any procedure(s) performed by the resident/non-physician practitioner and I was immediately available to provide assistance  At this point I agree with the current assessment done in the Emergency Department  I have conducted an independent evaluation of this patient a history and physical is as follows:    26-year-old male presents to the emergency department feeling poorly  He began experiencing some upper abdominal discomfort yesterday evening which he describes as having felt gas  He appreciated a bloating sensation at that time  This morning he awoke with discomfort in the right side of the chest radiating to the right arm  Discomfort was sharp and achy  He did have nausea earlier today which has since resolved  Pain in the right side of the chest has been waxing and waning including while in the waiting area  He describes approximately 30 seconds prior to coming back to a room pain intensified, he broke out into a sweat and began appreciating some shortness of breath as well as return of nausea  Currently rates pain at a 9/10    EKG was performed and showed immediately to medical staff revealing significant ST elevations/STEMI  MI alert initiated  Patient denies any history of heart disease or known vascular problems  He does have hypertension and hyperlipidemia  On exam patient is obviously distressed  He is pale and profoundly diaphoretic  He is hypertensive  Mucous membranes are moist   Heart sounds regular  Lungs slightly diminished throughout    Abdomen is soft and nontender  Lower extremities with superficial varicosities  There is no tenderness  Strong PT pulses  ED Course  ED Course as of 03/03/22 0020   Wed Mar 02, 2022   2014 Patient appears ill-diaphoretic with chest pain 9/10  EKG consistent with STEMI  MI alert activated  Initial medications aspirin and nitroglycerin ordered  Upon review of chest x-ray with nice narrow cardiac silhouette/mediastinum Brilinta and heparin added  Patient will be headed to cath lab shortly  Accompanied patient over to cardiac catheterization lab  He had received 3rd nitroglycerin as well as an inch of nitropaste just prior to transport with only minimal discomfort at time arrival to lab  He did have incremental improvement following each tab  Patient appeared much improved from that on initial evaluation with resolution of diaphoresis and return of good coloring  Dr Maura Paulino provided with additional history obtained from patient           Critical Care Time  CriticalCare Time  Performed by: Ilia Danielle MD  Authorized by: Ilia Danielle MD     Critical care provider statement:     Critical care time (minutes):  30    Critical care was necessary to treat or prevent imminent or life-threatening deterioration of the following conditions:  Circulatory failure    Critical care was time spent personally by me on the following activities:  Obtaining history from patient or surrogate, examination of patient, ordering and performing treatments and interventions, ordering and review of laboratory studies, ordering and review of radiographic studies, re-evaluation of patient's condition, evaluation of patient's response to treatment, development of treatment plan with patient or surrogate and discussions with consultants

## 2022-03-03 NOTE — UTILIZATION REVIEW
Initial Clinical Review    Admission: Date/Time/Statement:   Admission Orders (From admission, onward)     Ordered        03/02/22 2225  Inpatient Admission  Once                      Orders Placed This Encounter   Procedures    Inpatient Admission     Standing Status:   Standing     Number of Occurrences:   1     Order Specific Question:   Level of Care     Answer:   Level 1 Stepdown [13]     Order Specific Question:   Estimated length of stay     Answer:   More than 2 Midnights     Order Specific Question:   Certification     Answer:   I certify that inpatient services are medically necessary for this patient for a duration of greater than two midnights  See H&P and MD Progress Notes for additional information about the patient's course of treatment  ED Arrival Information     Expected Arrival Acuity    - 3/2/2022 17:30 Urgent         Means of arrival Escorted by Service Admission type    Walk-In Spouse Cardiology Urgent         Arrival complaint    Chest Pain        Chief Complaint   Patient presents with    Chest Pain     Pt reports RUQ abd pain starting last night, pain now radiating into R upper chest  Denies NVD/dizziness/SOB       Initial Presentation: This is a 64year old male from home to ED admitted inpatient due to ST elevation MI    PMH of hypertension, hyperlipidemia, and arthritis  Presented due to chest pain starting afternoon prior to arrival has abdominal pain and on afternoon of arrival moved into right chest and worsened  Has diaphoresis and nausea  On exam hypertensive  Diaphoretic,  Chest pain 10/10  Tachycardic   K 3 3  Hs Tnl 12> 2h 72  Wbc 13 89  Ecg indicated ST-elevation in leads 2, 3, AVF, V3 and V4 as well as depressions in lead 1, aVL and V2  In the ED given ntg, asa, Zofran  Started on Brilinta and Heparin  Cardiology consulted and taken to cath lab  Per cardiology: patient has acute inferior wall MI and emergent cath done  PTCA done on mid RCA    Stenting attempted but stents not able to track vessel to lesion  Proximal RCA had 80% tubular lesion, 4 0 X 23 ITZEL done  Plan is asa, Brilinta, echo and continued IVF    Date: 3/3/22    Day 2: no chest discomfort  On exam abdominal distention  Varicose veins L>R  Non pitting edema  Lungs decreased breath sounds  To continue asa, Brinlinta, atorvastatin  Telemetry  Check echo  Probable addition of beta blocker later today  Goal SBP < 180, lisinopril on hold  Resume home amlodipine  Replete K  ED Triage Vitals [03/02/22 1741]   Temperature Pulse Respirations Blood Pressure SpO2   98 1 °F (36 7 °C) 99 18 (!) 190/91 98 %      Temp Source Heart Rate Source Patient Position - Orthostatic VS BP Location FiO2 (%)   Oral Monitor Sitting Left arm --      Pain Score       7          Wt Readings from Last 1 Encounters:   03/03/22 100 kg (221 lb 5 5 oz)     Additional Vital Signs:   03/03/22 0723 98 2 °F (36 8 °C) 77 18 150/90 114 98 % -- -- None (Room air) Lying   03/03/22 0249 98 6 °F (37 °C) 73 18 126/80 99 98 % -- -- -- Lying   03/03/22 0100 -- 70 -- 139/85 106 97 % -- -- -- --   03/03/22 0045 -- 68 -- 148/86 110 97 % -- -- -- --   03/02/22 2300 -- 73 18 127/78 98 99 % -- -- None (Room air) Lying   03/02/22 2235 -- 80 -- 127/77 96 97 % -- -- None (Room air) --   03/02/22 2015 -- 93 18 164/99 -- 97 % -- -- None (Room air)        Pertinent Labs/Diagnostic Test Results:   XR chest 1 view portable   Final Result by Keli Gordillo MD (03/03 0921)      No acute cardiopulmonary disease                    Workstation performed: [id number]           3/2/22 ecg - 1954 - Previous ECG:  Unavailable    Comparison to cardiac monitor: Yes    Interpretation:     Interpretation: abnormal    Rate:     ECG rate:  98    ECG rate assessment: normal    Ectopy:     Ectopy: none    QRS:     QRS axis:  Normal    QRS intervals:  Normal  Conduction:     Conduction: normal    ST segments:     ST segments:  Abnormal    Elevation:  II, III, aVF, V3, V4, V5 and V6    Depression:  I and aVL  T waves:     T waves: non-specific    Comments:      Concern for Acute inferior MI    3/2/22 2155 ecg - Previous ECG:  Compared to current    Comparison ECG info: Inferior MI    Similarity:  Changes noted    Comparison to cardiac monitor: Yes    Interpretation:     Interpretation: abnormal    Rate:     ECG rate:  94    ECG rate assessment: normal    Rhythm:     Rhythm: other rhythm    Ectopy:     Ectopy: none    QRS:     QRS axis:  Normal    QRS intervals:  Normal  Conduction:     Conduction: normal    ST segments:     ST segments:  Abnormal    Elevation:  II, III, aVF, V3 and V4    Depression:  I and aVL  Comments:      Still concerning for inferior wall MI  Cath lab has been notified    3/2/22 cardiac cath Right Coronary Artery: The vessel was visualized by angiography, is large and dominant  There is moderate diffuse disease throughout the vessel  The vessel is mildly calcified  The vessel is tortuous  The vessel is ectatic  The vessel has a moderate aneurysmal dilitation in the entire segment  Prox RCA lesion is 80% stenosed  Culprit lesion  JOSEPH flow is 3  The lesion is type C and tubular  Mid RCA lesion is 90% stenosed  Culprit lesion  Culprit for clinical presentation  JOSEPH flow is 2  The lesion is type C and focal  The lesion is moderately calcified  · Prox RCA lesion: Stent: Drug-eluting stent was successfully placed  The stent used was a STENT XIENCE SKYPOINT 4 X 23MM  · The intervention was successful  The guidewire crossed the lesion  Post-intervention JOSEPH flow is 3  Lesion had 23 mm of its length treated  There is a 0% residual stenosis post intervention  · PTCA of the mid RCA was performed  The intervention was successful  STENTING OF THE MID RCA WAS ATTEMPTED BUT THE STENT WOULD NOT PASS TO THE LESION DUE TO THE SEVERE TORTUOSITY AND ANEURYSMAL DILATATION OF THE PROXIMAL AND MID VESSEL  The guidewire crossed the lesion   Post-intervention JOSEPH flow is 3  Lesion had 15 mm of its length treated  There were no complications  3/3/22 echo -  Left Ventricle: Left ventricular cavity size is normal  Wall thickness is not well visualized  The left ventricular ejection fraction is 60-65%  Systolic function is normal  Basal inferolateral hypokinesis with possible basal inferior hypokinesis  Otherwise normal wall motion   Diastolic function is normal       Results from last 7 days   Lab Units 03/03/22  0652 03/02/22  1747   WBC Thousand/uL 12 41* 13 89*   HEMOGLOBIN g/dL 15 3 17 0   HEMATOCRIT % 42 1 48 3   PLATELETS Thousands/uL 299 355   NEUTROS ABS Thousands/µL 8 37* 10 69*     Results from last 7 days   Lab Units 03/03/22  0651 03/02/22 2013 03/02/22  1747   SODIUM mmol/L  --   --  139   POTASSIUM mmol/L  --   --  3 3*   CHLORIDE mmol/L  --   --  102   CO2 mmol/L  --   --  20*   ANION GAP mmol/L  --   --  17*   BUN mg/dL  --   --  16   CREATININE mg/dL  --   --  1 17   EGFR ml/min/1 73sq m  --   --  66   CALCIUM mg/dL  --   --  8 9   MAGNESIUM mg/dL 2 2 2 1  --      Results from last 7 days   Lab Units 03/02/22  1747   AST U/L 19   ALT U/L 33   ALK PHOS U/L 88   TOTAL PROTEIN g/dL 8 2   ALBUMIN g/dL 3 9   TOTAL BILIRUBIN mg/dL 0 93     Results from last 7 days   Lab Units 03/02/22  1747   GLUCOSE RANDOM mg/dL 165*     Results from last 7 days   Lab Units 03/02/22  2331 03/02/22  1747 03/02/22  0000   HS TNI 0HR ng/L  --  12  --    HS TNI 2HR ng/L  --   --  72*   HSTNI D2 ng/L  --   --  60*   HS TNI 4HR ng/L 328*  --   --    HSTNI D4 ng/L 316*  --   --      Results from last 7 days   Lab Units 03/02/22 2013   PROTIME seconds 13 2   INR  1 00   PTT seconds 31     Results from last 7 days   Lab Units 03/02/22  1747   LIPASE u/L 38*       ED Treatment:   Medication Administration from 03/02/2022 1730 to 03/02/2022 2036       Date/Time Order Dose Route Action Comments     03/02/2022 2007 aspirin chewable tablet 324 mg 324 mg Oral Given      03/02/2022 2033 nitroglycerin (NITROSTAT) SL tablet 0 4 mg 0 4 mg Sublingual Given      03/02/2022 2021 nitroglycerin (NITROSTAT) SL tablet 0 4 mg 0 4 mg Sublingual Given      03/02/2022 2010 nitroglycerin (NITROSTAT) SL tablet 0 4 mg 0 4 mg Sublingual Given      03/02/2022 2016 heparin (porcine) injection 4,000 Units 4,000 Units Intravenous Given      03/02/2022 2014 ticagrelor (BRILINTA) tablet 180 mg 180 mg Oral Given      03/02/2022 2007 ondansetron (ZOFRAN) injection 4 mg   Intravenous Given         Past Medical History:   Diagnosis Date    Arthritis     Hypertension      Present on Admission:   ST elevation myocardial infarction involving right coronary artery ([facility] )   Hypertension   HLD (hyperlipidemia)   Leukocytosis   Hyperglycemia   Hypokalemia      Admitting Diagnosis: Chest pain [R07 9]  STEMI (ST elevation myocardial infarction) (HCC) [I21 3]  Acute MI, inferior wall ([facility] ) [I21 19]  Age/Sex: 64 y o  male  Admission Orders: 3/2/22 2225 inpatient   Scheduled Medications:  aspirin, 81 mg, Oral, Daily  atorvastatin, 80 mg, Oral, QPM  heparin (porcine), 5,000 Units, Subcutaneous, Q8H AUDI  ticagrelor, 90 mg, Oral, Q12H [address]    potassium chloride (K-DUR,KLOR-CON) CR tablet 40 mEq  Dose: 40 mEq  Freq: Once Route: PO  Start: 03/02/22 2230 End: 03/02/22 2317    potassium chloride 20 mEq IVPB (premix)  Dose: 20 mEq  Freq: Once Route: IV  Last Dose: 20 mEq (03/02/22 2335)  Start: 03/02/22 2230 End: 03/03/22 0135      Continuous IV Infusions:sodium chloride 0 9 % infusion  Rate: 100 mL/hr Dose: 100 mL/hr  Freq: Continuous Route: IV  Indications of Use: IV Hydration  Last Dose: 100 mL/hr (03/03/22 0327)  Start: 03/02/22 2230 End: 03/03/22 0918     PRN Meds:  nitroglycerin, 0 4 mg, Sublingual, Q5 Min PRN - used x 3 3/2  sodium chloride (PF), 3 mL, Intravenous, Q1H PRN    Cardio pulmonary monitoring  telemetry  Oxygen for sat > 90%       IP CONSULT TO CARDIOLOGY  IP CONSULT TO MEDICAL CRITICAL CARE    Network Utilization Review Department  ATTENTION: Please call with any questions or concerns to [phone] and carefully listen to the prompts so that you are directed to the right person  All voicemails are confidential   Candidolene Pair all requests for admission clinical reviews, approved or denied determinations and any other requests to dedicated fax number below belonging to the campus where the patient is receiving treatment   List of dedicated fax numbers for the Facilities:  [address] DENIALS (Administrative/Medical Necessity) [phone]   [address] (Maternity/NICU/Pediatrics) [phone]   [address]  [address] Medical [place] [address] [address] [place] [address] [address] [facility] [place] [address] [phone]

## 2022-03-03 NOTE — PROGRESS NOTES
[facility]  Progress Note Rolly Medel 1960, 64 y o  male MRN: [id number]  Unit/Bed#: S -01 Encounter: [id number]  Primary Care Provider: ISRAEL Call   Date and time admitted to hospital: 3/2/2022  7:49 PM    * ST elevation myocardial infarction involving right coronary artery [facility])  Assessment & Plan  · 3/2/22 Cardiac catheterization: Very large aneurysmal, tortuous, calcified RCA  100 % mid occlusion  PTCA was performed on mid RCA with a 4 0 NC balloon with a good result  Stenting was attempted but the stents were not able to track the vessel to the lesion  Proximal RCA had an 80% tubular lesion   A 4 0 x 23 mm ITZEL was deployed here  · ASA 81mg daily   · S/p 325mg loading dose (3/2/22)   · Brilinta 90mg daily   · S/p 180mg loading dose (3/2/22)  · Atorvastatin 80mg qHS   · Increased from home 40mg qHS   · Likely will begin beta blocker today  · Cardiology following as primary   · Continue continuous telemetry monitoring   · Follow up EKG in AM  · Echocardiogram ordered     Hypertension  Assessment & Plan  · Goal SBP < 180  · Goal MAP > 65  · Holding lisinopril 50mg daily for now given slight creatinine elevation from baseline in the setting of recent contrast load  · Resume home amlodipine 10 mg daily if BP tolerates following initiation of beta blocker   · VS per step down protocol     HLD (hyperlipidemia)  Assessment & Plan  · Atorvastatin 40mg qHS  · Increased from home 40mg qHS   · 3/2/22 Lipid panel:  · Total cholesterol: 159  · Triglyceride: 96  · HDL: 45  · LDL: 95    Hyperglycemia  Assessment & Plan  · Follow up AM hemoglobin A1c   · Continue sliding scale insulin AC/HS  · Adjust as needed to maintain -180    Hypokalemia  Assessment & Plan  · Goal > 4 0  · Replete with oral and IV repletion now  · Follow up AM BMP     Leukocytosis  Assessment & Plan  · Likely leukemoid stress reaction   · History not consistent with new infection   · Follow up AM CBC  · Monitor WBC and fever curve off antibiotics          ----------------------------------------------------------------------------------------  HPI/24hr events:   · No acute events overnight   · Remained on room air, no dysrhythmias     Patient appropriate for transfer out of the ICU today?: Patient does not meet criteria for ICU Follow-up Clinic; referral has not been made  Disposition: Transfer to [facility] with Telemetry   Code Status: Level 1 - Full Code  ---------------------------------------------------------------------------------------  SUBJECTIVE  "I slept pretty well  Still no pain"     Review of Systems   Respiratory: Negative  Negative for shortness of breath  Cardiovascular: Negative for chest pain, palpitations and leg swelling  Gastrointestinal: Positive for abdominal distention  Negative for abdominal pain, constipation, diarrhea, nausea and vomiting  Musculoskeletal: Negative  Neurological: Negative for dizziness  Review of systems was reviewed and negative unless stated above in HPI/24-hour events   ---------------------------------------------------------------------------------------  OBJECTIVE    Vitals   Vitals:    22 0115 22 0130 22 0145 22 0249   BP: 140/87 138/85 129/81 126/80   BP Location:    Left arm   Pulse: 76 74 72 73   Resp:    18   Temp:    98 6 °F (37 °C)   TempSrc:    Axillary   SpO2: 98% 98% 98% 98%   Weight:    100 kg (221 lb 5 5 oz)   Height:         Temp (24hrs), Av 4 °F (36 9 °C), Min:98 1 °F (36 7 °C), Max:98 6 °F (37 °C)  Current: Temperature: 98 6 °F (37 °C)          Respiratory:  SpO2: SpO2: 98 %, SpO2 Activity: SpO2 Activity: At Rest, SpO2 Device: O2 Device: None (Room air)    Physical Exam  Vitals reviewed  Constitutional:       General: He is sleeping  He is not in acute distress  Appearance: Normal appearance  HENT:      Head: Normocephalic and atraumatic     Eyes:      Conjunctiva/sclera: Conjunctivae normal       Pupils: Pupils are equal, round, and reactive to light  Cardiovascular:      Rate and Rhythm: Normal rate and regular rhythm  Pulses:           Radial pulses are 2+ on the right side and 2+ on the left side  Dorsalis pedis pulses are 2+ on the right side and 2+ on the left side  Heart sounds: Normal heart sounds  Comments: Non-pitting edema  Varicose veins L > R  Pulmonary:      Effort: Pulmonary effort is normal       Breath sounds: Examination of the right-lower field reveals decreased breath sounds  Examination of the left-lower field reveals decreased breath sounds  Decreased breath sounds present  No wheezing or rhonchi  Abdominal:      General: Abdomen is protuberant  There is no distension  Palpations: Abdomen is soft  Tenderness: There is no abdominal tenderness  Musculoskeletal:      Cervical back: Full passive range of motion without pain  Skin:     General: Skin is warm and dry  Capillary Refill: Capillary refill takes less than 2 seconds  Neurological:      General: No focal deficit present  Mental Status: He is easily aroused  GCS: GCS eye subscore is 4  GCS verbal subscore is 5  GCS motor subscore is 6  Motor: Motor function is intact  Psychiatric:         Behavior: Behavior is cooperative               Laboratory and Diagnostics:  Results from last 7 days   Lab Units 03/02/22  1747   WBC Thousand/uL 13 89*   HEMOGLOBIN g/dL 17 0   HEMATOCRIT % 48 3   PLATELETS Thousands/uL 355   NEUTROS PCT % 78*   MONOS PCT % 5     Results from last 7 days   Lab Units 03/02/22  1747   SODIUM mmol/L 139   POTASSIUM mmol/L 3 3*   CHLORIDE mmol/L 102   CO2 mmol/L 20*   ANION GAP mmol/L 17*   BUN mg/dL 16   CREATININE mg/dL 1 17   CALCIUM mg/dL 8 9   GLUCOSE RANDOM mg/dL 165*   ALT U/L 33   AST U/L 19   ALK PHOS U/L 88   ALBUMIN g/dL 3 9   TOTAL BILIRUBIN mg/dL 0 93     Results from last 7 days   Lab Units 03/02/22 2013   MAGNESIUM mg/dL 2 1 Results from last 7 days   Lab Units 03/02/22 2013   INR  1 00   PTT seconds 31        EKG: NSR   Imaging: I have personally reviewed pertinent reports  and I have personally reviewed pertinent films in PACS    Intake and Output  I/O     None          Height and Weights   Height: 5' 6" (167 6 cm)  IBW (Ideal Body Weight): 63 8 kg  Body mass index is 35 73 kg/m²  Weight (last 2 days)     Date/Time Weight    03/03/22 0249 100 (221 34)    03/02/22 1741 96 6 (213)            Nutrition       Diet Orders   (From admission, onward)             Start     Ordered    03/02/22 2237  Room Service  Once        Question:  Type of Service  Answer:  Room Service-Appropriate    03/02/22 2236 03/02/22 2226  Diet Cardiovascular; Cardiac  Diet effective now        References:    Nutrtion Support Algorithm Enteral vs  Parenteral   Question Answer Comment   Diet Type Cardiovascular    Cardiac Cardiac    RD to adjust diet per protocol?  Yes        03/02/22 2225                  Active Medications  Scheduled Meds:  Current Facility-Administered Medications   Medication Dose Route Frequency Provider Last Rate    aspirin  81 mg Oral Daily Mariella Cortes MD      atorvastatin  80 mg Oral QPM Mariella Cortes MD      heparin (porcine)  5,000 Units Subcutaneous [facility] [place], CRNP      nitroglycerin  0 4 mg Sublingual Q5 Min PRN Jazlyn Parent, DO      sodium chloride (PF)  3 mL Intravenous Q1H PRN Jazlyn Lam DO      sodium chloride  100 mL/hr Intravenous Continuous Mariella Cortes  mL/hr (03/03/22 0327)    ticagrelor  90 mg Oral Q12H [facility] Chandra Ernandez MD       Continuous Infusions:  sodium chloride, 100 mL/hr, Last Rate: 100 mL/hr (03/03/22 0327)      PRN Meds:   nitroglycerin, 0 4 mg, Q5 Min PRN  sodium chloride (PF), 3 mL, Q1H PRN        Invasive Devices Review  Invasive Devices  Report    Peripheral Intravenous Line            Peripheral IV 03/02/22 Left Antecubital <1 day Peripheral IV 03/02/22 Left Hand <1 day                Rationale for remaining devices: N/A  ---------------------------------------------------------------------------------------  Advance Directive and Living Will:      Power of :    POLST:    ---------------------------------------------------------------------------------------  Care Time Delivered:   No Critical Care time spent       ISRAEL Velasco      Portions of the record may have been created with voice recognition software  Occasional wrong word or "sound a like" substitutions may have occurred due to the inherent limitations of voice recognition software    Read the chart carefully and recognize, using context, where substitutions have occurred

## 2022-03-03 NOTE — ED PROVIDER NOTES
History  Chief Complaint   Patient presents with    Chest Pain     Pt reports RUQ abd pain starting last night, pain now radiating into R upper chest  Denies NVD/dizziness/SOB     Patient is a 69-year-old male with a past medical history of hypertension and arthritis who presents emergency department with complaint of chest pain  The patient reports that he began to experience abdominal pain yesterday afternoon the thought that it was bowel gas  His abdominal pain continued through the night and throughout today while he was at work  Patient reports that this afternoon his abdominal pain began to move into his right chest and progressively worsened  He now describes his chest pain as sharp stabbing pain that radiates into his right arm and he rates his pain a 10/10  The patient denies any radiation to his back, headache, shortness of breath or lightheadedness  He does report being diaphoretic with mild nausea  He denies abdominal pain this time or vomiting, dysuria, hematuria fever  Patient denies any specific history cardiac pathology but does state that his father had an aortic aneurysm  The patient is not on any blood thinners and takes amlodipine and lisinopril for hypertension as well as atorvastatin for hyperlipidemia  None       Past Medical History:   Diagnosis Date    Arthritis     Hypertension        Past Surgical History:   Procedure Laterality Date    TONSILLECTOMY         History reviewed  No pertinent family history  I have reviewed and agree with the history as documented  E-Cigarette/Vaping     E-Cigarette/Vaping Substances     Social History     Tobacco Use    Smoking status: Former Smoker     Types: Cigarettes    Smokeless tobacco: Never Used   Substance Use Topics    Alcohol use: Yes     Comment: occassionally     Drug use: Yes     Types: Marijuana        Review of Systems   Constitutional: Positive for diaphoresis  Negative for chills and fever     HENT: Negative for ear pain and sore throat  Eyes: Negative for pain and visual disturbance  Respiratory: Negative for cough and shortness of breath  Cardiovascular: Positive for chest pain  Negative for palpitations  Gastrointestinal: Positive for abdominal pain  Negative for vomiting  Genitourinary: Negative for dysuria and hematuria  Musculoskeletal: Negative for arthralgias and back pain  Skin: Negative for color change and rash  Neurological: Negative for seizures, syncope, light-headedness and headaches  All other systems reviewed and are negative  Physical Exam  ED Triage Vitals [03/02/22 1741]   Temperature Pulse Respirations Blood Pressure SpO2   98 1 °F (36 7 °C) 99 18 (!) 190/91 98 %      Temp Source Heart Rate Source Patient Position - Orthostatic VS BP Location FiO2 (%)   Oral Monitor Sitting Left arm --      Pain Score       7             Orthostatic Vital Signs  Vitals:    03/02/22 1741 03/02/22 2015 03/02/22 2025   BP: (!) 190/91 164/99 (!) 174/85   Pulse: 99 93 101   Patient Position - Orthostatic VS: Sitting         Physical Exam  Vitals and nursing note reviewed  Constitutional:       General: He is in acute distress  Appearance: He is well-developed  He is ill-appearing  Comments: Patient was diaphoretic and experiencing severe chest pain that he rated 10/10  HENT:      Head: Normocephalic and atraumatic  Eyes:      Extraocular Movements: Extraocular movements intact  Pupils: Pupils are equal, round, and reactive to light  Neck:      Vascular: No JVD  Trachea: No tracheal deviation  Cardiovascular:      Rate and Rhythm: Regular rhythm  Tachycardia present  Pulses:           Carotid pulses are 2+ on the right side and 2+ on the left side  Radial pulses are 2+ on the right side and 2+ on the left side  Dorsalis pedis pulses are 2+ on the right side and 2+ on the left side  Heart sounds: Normal heart sounds  No murmur heard    No friction rub    Pulmonary:      Effort: Pulmonary effort is normal  No accessory muscle usage or respiratory distress  Breath sounds: No decreased breath sounds, wheezing, rhonchi or rales  Chest:      Chest wall: No mass, tenderness, crepitus or edema  Abdominal:      General: Bowel sounds are normal  There is no abdominal bruit  Palpations: Abdomen is soft  There is no mass  Tenderness: There is no abdominal tenderness  There is no guarding or rebound  Musculoskeletal:         General: Normal range of motion  Cervical back: Normal range of motion and neck supple  Right lower leg: No tenderness  No edema  Left lower leg: No tenderness  No edema  Comments: Patient had multiple varicose veins on his left lower leg but he states that it has been that way for years and is not a new finding  Lymphadenopathy:      Cervical: No cervical adenopathy  Skin:     General: Skin is warm  Capillary Refill: Capillary refill takes 2 to 3 seconds  Coloration: Skin is not cyanotic  Findings: No erythema or rash  Neurological:      General: No focal deficit present  Mental Status: He is alert and oriented to person, place, and time  Cranial Nerves: No cranial nerve deficit  Motor: No weakness           ED Medications  Medications   sodium chloride (PF) 0 9 % injection 3 mL (has no administration in time range)   aspirin chewable tablet 324 mg (0 mg Oral Hold 3/2/22 2015)   nitroglycerin (NITROSTAT) SL tablet 0 4 mg (0 4 mg Sublingual Given 3/2/22 2021)   ticagrelor (BRILINTA) tablet 180 mg (180 mg Oral Given 3/2/22 2014)     And   ticagrelor (BRILINTA) tablet 90 mg (has no administration in time range)   fentanyl citrate (PF) 100 MCG/2ML (50 mcg Intravenous Given 3/2/22 2043)   midazolam (VERSED) injection (2 mg Intravenous Given 3/2/22 2043)   lidocaine (PF) (XYLOCAINE-MPF) 1 % injection (1 mL Infiltration Given 3/2/22 2046)   verapamil (ISOPTIN) injection (2 5 mg Given 3/2/22 2048)   heparin (porcine) injection (8,000 Units Intravenous Given 3/2/22 2048)   nitroGLYcerin 200 mcg/mL IA bolus (200 mcg Intra-arterial Given 3/2/22 2048)   aspirin chewable tablet 324 mg (324 mg Oral Given 3/2/22 2007)   heparin (porcine) injection 4,000 Units (4,000 Units Intravenous Given 3/2/22 2016)   ondansetron (ZOFRAN) injection 4 mg ( Intravenous Given 3/2/22 2007)   nitroglycerin (NITRO-BID) 2 % TD ointment 1 inch (1 inch Topical Given 3/2/22 2042)       Diagnostic Studies  Results Reviewed     Procedure Component Value Units Date/Time    Lipid panel [[id number]] Collected: 03/02/22 2013    Lab Status: Final result Specimen: Blood from Arm, Left Updated: 03/02/22 2041     Cholesterol 159 mg/dL      Triglycerides 96 mg/dL      HDL, Direct 45 mg/dL      LDL Calculated 95 mg/dL      Non-HDL-Chol (CHOL-HDL) 114 mg/dl     Magnesium [[id number]]  (Normal) Collected: 03/02/22 2013    Lab Status: Final result Specimen: Blood from Arm, Left Updated: 03/02/22 2041     Magnesium 2 1 mg/dL     HS Troponin I 2hr [[id number]]  (Abnormal) Collected: 03/02/22    Lab Status: Final result Specimen: Blood Updated: 03/02/22 2038     hs TnI 2hr 72 ng/L      Delta 2hr hsTnI 60 ng/L     Protime-INR [[id number]] Collected: 03/02/22 2013    Lab Status: No result Specimen: Blood from Arm, Left     APTT [[id number]] Collected: 03/02/22 2013    Lab Status: No result Specimen: Blood from Arm, Left     HS Troponin I 4hr [[id number]]     Lab Status: No result Specimen: Blood     HS Troponin 0hr (reflex protocol) [[id number]]  (Normal) Collected: 03/02/22 1747    Lab Status: Final result Specimen: Blood from Arm, Right Updated: 03/02/22 1823     hs TnI 0hr 12 ng/L     Comprehensive metabolic panel [[id number]]  (Abnormal) Collected: 03/02/22 1747    Lab Status: Final result Specimen: Blood from Arm, Right Updated: 03/02/22 1813     Sodium 139 mmol/L      Potassium 3 3 mmol/L      Chloride 102 mmol/L      CO2 20 mmol/L      ANION GAP 17 mmol/L      BUN 16 mg/dL      Creatinine 1 17 mg/dL      Glucose 165 mg/dL      Calcium 8 9 mg/dL      AST 19 U/L      ALT 33 U/L      Alkaline Phosphatase 88 U/L      Total Protein 8 2 g/dL      Albumin 3 9 g/dL      Total Bilirubin 0 93 mg/dL      eGFR 66 ml/min/1 73sq m     Narrative:      [place] guidelines for Chronic Kidney Disease (CKD):     Stage 1 with normal or high GFR (GFR > 90 mL/min/1 73 square meters)    Stage 2 Mild CKD (GFR = 60-89 mL/min/1 73 square meters)    Stage 3A Moderate CKD (GFR = 45-59 mL/min/1 73 square meters)    Stage 3B Moderate CKD (GFR = 30-44 mL/min/1 73 square meters)    Stage 4 Severe CKD (GFR = 15-29 mL/min/1 73 square meters)    Stage 5 End Stage CKD (GFR <15 mL/min/1 73 square meters)  Note: GFR calculation is accurate only with a steady state creatinine    Lipase [[id number]]  (Abnormal) Collected: 03/02/22 1747    Lab Status: Final result Specimen: Blood from Arm, Right Updated: 03/02/22 1813     Lipase 38 u/L     CBC and differential [[id number]]  (Abnormal) Collected: 03/02/22 1747    Lab Status: Final result Specimen: Blood from Arm, Right Updated: 03/02/22 1802     WBC 13 89 Thousand/uL      RBC 5 74 Million/uL      Hemoglobin 17 0 g/dL      Hematocrit 48 3 %      MCV 84 fL      MCH 29 6 pg      MCHC 35 2 g/dL      RDW 12 2 %      MPV 9 6 fL      Platelets 591 Thousands/uL      nRBC 0 /100 WBCs      Neutrophils Relative 78 %      Immat GRANS % 0 %      Lymphocytes Relative 17 %      Monocytes Relative 5 %      Eosinophils Relative 0 %      Basophils Relative 0 %      Neutrophils Absolute 10 69 Thousands/µL      Immature Grans Absolute 0 05 Thousand/uL      Lymphocytes Absolute 2 36 Thousands/µL      Monocytes Absolute 0 71 Thousand/µL      Eosinophils Absolute 0 02 Thousand/µL      Basophils Absolute 0 06 Thousands/µL                  XR chest 1 view portable    (Results Pending)         Procedures  ECG 12 Lead Documentation Only    Date/Time: 3/2/2022 7:54 PM  Performed by: Emile Pickard DO  Authorized by: Emile Pickard DO     Indications / Diagnosis:  Chest pain  ECG reviewed by me, the ED Provider: yes    Patient location:  ED  Previous ECG:     Previous ECG:  Unavailable    Comparison to cardiac monitor: Yes    Interpretation:     Interpretation: abnormal    Rate:     ECG rate:  98    ECG rate assessment: normal    Ectopy:     Ectopy: none    QRS:     QRS axis:  Normal    QRS intervals:  Normal  Conduction:     Conduction: normal    ST segments:     ST segments:  Abnormal    Elevation:  II, III, aVF, V3, V4, V5 and V6    Depression:  I and aVL  T waves:     T waves: non-specific    Comments:      Concern for Acute inferior MI  ECG 12 Lead Documentation Only    Date/Time: 3/2/2022 9:55 PM  Performed by: Emile Pickard DO  Authorized by: Emile Pickard DO     Indications / Diagnosis:  Chest pain  ECG reviewed by me, the ED Provider: yes    Patient location:  ED  Previous ECG:     Previous ECG:  Compared to current    Comparison ECG info: Inferior MI    Similarity:  Changes noted    Comparison to cardiac monitor: Yes    Interpretation:     Interpretation: abnormal    Rate:     ECG rate:  94    ECG rate assessment: normal    Rhythm:     Rhythm: other rhythm    Ectopy:     Ectopy: none    QRS:     QRS axis:  Normal    QRS intervals:  Normal  Conduction:     Conduction: normal    ST segments:     ST segments:  Abnormal    Elevation:  II, III, aVF, V3 and V4    Depression:  I and aVL  Comments:      Still concerning for inferior wall MI  Cath lab has been notified        Conscious Sedation Assessment      Classification Score   ASA Scale Assessment 3-Severe systemic disease that results in functional limitation filed at 03/02/2022 2042   Mallampati Classification Class II: soft palate, uvula, fauces visible - No Difficulty filed at 03/02/2022 2042          ED Course                             SBIRT 22yo+ Most Recent Value   SBIRT (24 yo +)    In order to provide better care to our patients, we are screening all of our patients for alcohol and drug use  Would it be okay to ask you these screening questions? Unable to answer at this time Filed at: 03/02/2022 4953                [facility]  Number of Diagnoses or Management Options  Acute MI, inferior wall [facility])  Diagnosis management comments: Patient is a 66-year-old male with a past medical history of hypertension and arthritis who presents emergency department with complaint of chest pain  Upon initial presentation the patient was alert orient x3 and a significant amount of pain with diaphoresis and nausea  The patient's initial EKG indicated ST-elevation in leads 2, 3, AVF, V3 and V4 as well as depressions in lead 1, aVL and V2  We are concerned for an inferior MI at this time  We alerted the cath lab of the [facility] at 20:07 and spoke with Dr Tracy Rincon of [facility] cardiology who agreed to see the patient  We also ordered a full ACS workup as well as 0 4 mg of sublingual nitro, 324 mg of aspirin and 4 mg of Zofran  We have also initiated Brilinta and heparin at this time  Patient remained hypertensive and received two additional doses of 0 4 sublingual nitro for a total of 1 2 sublingual nitro  He now rates his pain 2/10 and feels much improved  Patient was taken to Cath lab at 20:20        Disposition  Final diagnoses:   Acute MI, inferior wall ([address] )     Time reflects when diagnosis was documented in both MDM as applicable and the Disposition within this note     Time User Action Codes Description Comment    3/2/2022  8:08 PM [place] Race Add [I21 19] Acute MI, inferior wall ([facility] [address] )     3/2/2022  8:27 PM Kaylene Spearvashti Add [I21 3] STEMI (ST elevation myocardial infarction) [facility])       ED Disposition     ED Disposition Condition Date/Time Comment    Send to Cath Lab  Wed Mar 2, 2022  8:25 PM Dr Tracy Rincon      Follow-up Information    None         There are no discharge medications for this patient  No discharge procedures on file  PDMP Review     None           ED Provider  Attending physically available and evaluated Karthik Valle  OLIVA managed the patient along with the ED Attending      Electronically Signed by         Wilmer Prasad DO  03/02/22 2052

## 2022-03-03 NOTE — ASSESSMENT & PLAN NOTE
· Follow up AM hemoglobin A1c   · Continue sliding scale insulin AC/HS  · Adjust as needed to maintain -180

## 2022-03-03 NOTE — H&P
[address] 1960, 64 y o  male MRN: [id number]  Unit/Bed#: S -01 Encounter: [id number]  Primary Care Provider: ISRAEL Cannon   Date and time admitted to hospital: 3/2/2022  7:49 PM    * ST elevation myocardial infarction involving right coronary artery [facility])  Assessment & Plan  · 3/2/22 Cardiac catheterization: Very large aneurysmal, tortuous, calcified RCA  100 % mid occlusion  PTCA was performed on mid RCA with a 4 0 NC balloon with a good result  Stenting was attempted but the stents were not able to track the vessel to the lesion  Proximal RCA had an 80% tubular lesion   A 4 0 x 23 mm ITZEL was deployed here  · ASA 81mg daily   · S/p 325mg loading dose (3/2/22)   · Brilinta 90mg daily   · S/p 180mg loading dose (3/2/22)  · Atorvastatin 80mg qHS   · Increased from home 40mg qHS   · Likely will begin beta blocker tomorrow   · Cardiology following as primary   · Continue continuous telemetry monitoring   · EKG now and in AM  · Echocardiogram ordered for tomorrow     Hypertension  Assessment & Plan  · Goal SBP < 180  · Goal MAP > 65  · Holding lisinopril 50mg daily for now given slight creatinine elevation from baseline in the setting of recent contrast load  · Resume home amlodipine 10 mg daily   · VS per step down protocol     HLD (hyperlipidemia)  Assessment & Plan  · Atorvastatin 40mg qHS  · Increased from home 40mg qHS   · 3/2/22 Lipid panel:  · Total cholesterol: 159  · Triglyceride: 96  · HDL: 45  · LDL: 95    Hyperglycemia  Assessment & Plan  · Check hemoglobin A1c  · Initiate sliding scale insulin AC/HS  · Adjust as needed to maintain -180    Hypokalemia  Assessment & Plan  · Goal > 4 0  · Replete with oral and IV repletion now  · BMP in AM    Leukocytosis  Assessment & Plan  · Likely leukemoid stress reaction   · History not consistent with new infection   · CBC in AM   · Monitor WBC and fever curve off antibiotics      -------------------------------------------------------------------------------------------------------------  Chief Complaint: "I feel much better now  I had a lot of chest pain"     History of Present Illness     Arielle Wilson is a 64 y o  male with hypertension, hyperlipidemia, and arthritis presented to the ED today with an anterior wall STEMI  He states that yesterday he began feeling a dull right upper quadrant pain  He thought it was gas pain, and was able to sleep through the night  He did note the pain again this morning, but went to work as usual  His pain then progressed and moved from his abdomen to his right chest and intensified to 10/10 with right arm radiation  He also reported mild nausea without vomiting  Upon arrival to the ED, and EKG revealed ST elevations in II, III, and aVF with reciprocal depressions  An MI alert was called, and the patient presents now to step down s/p cardiac catheterization with balloon angioplasty and ITZEL x2 to the proximal RCA  History obtained from chart review and the patient   -------------------------------------------------------------------------------------------------------------  Dispo: Admit to Stepdown Level 1    Code Status: Level 1 - Full Code  --------------------------------------------------------------------------------------------------------------  Review of Systems   Constitutional: Negative  HENT: Negative  Eyes: Negative  Negative for photophobia  Cardiovascular: Positive for leg swelling  Negative for chest pain and palpitations  Chronic leg swelling, no edema, varicose veins     Gastrointestinal: Positive for abdominal distention  Negative for constipation, diarrhea, nausea and vomiting  Endocrine: Negative  Genitourinary: Negative  Negative for difficulty urinating  Musculoskeletal: Negative  Negative for arthralgias and back pain  Skin: Negative  Allergic/Immunologic: Negative  Neurological: Negative  Negative for dizziness, syncope, weakness, light-headedness and headaches  Hematological: Negative  Psychiatric/Behavioral: Negative  All other systems reviewed and are negative  A 12-point, complete review of systems was reviewed and negative except as stated above     Physical Exam  Vitals reviewed  Constitutional:       General: He is awake  He is not in acute distress  Appearance: Normal appearance  HENT:      Head: Normocephalic and atraumatic  Mouth/Throat:      Lips: Pink  Mouth: Mucous membranes are moist    Eyes:      Conjunctiva/sclera: Conjunctivae normal       Pupils: Pupils are equal, round, and reactive to light  Cardiovascular:      Rate and Rhythm: Normal rate and regular rhythm  Pulses:           Radial pulses are 2+ on the left side  Right radial pulse not accessible  Dorsalis pedis pulses are 2+ on the right side and 2+ on the left side  Heart sounds: Normal heart sounds  Comments: Right radial TR band in place  BLE edema, non-pitting  Varicose veins L>R  Pulmonary:      Effort: Pulmonary effort is normal       Breath sounds: Examination of the right-lower field reveals decreased breath sounds  Examination of the left-lower field reveals decreased breath sounds  Decreased breath sounds present  No wheezing, rhonchi or rales  Abdominal:      General: Abdomen is protuberant  There is no distension  Palpations: Abdomen is soft  Tenderness: There is no abdominal tenderness  Musculoskeletal:      Right lower leg: Edema present  Left lower leg: Edema present  Skin:     General: Skin is warm and dry  Capillary Refill: Capillary refill takes less than 2 seconds  Neurological:      General: No focal deficit present  Mental Status: He is alert and oriented to person, place, and time  GCS: GCS eye subscore is 4  GCS verbal subscore is 5  GCS motor subscore is 6        Motor: Motor function is intact  Psychiatric:         Behavior: Behavior is cooperative        --------------------------------------------------------------------------------------------------------------  Vitals:   Vitals:    03/02/22 2015 03/02/22 2025 03/02/22 2042 03/02/22 2235   BP: 164/99 (!) 174/85  127/77   BP Location:       Pulse: 93 101  80   Resp: 18 18     Temp:       TempSrc:       SpO2: 97% 98% 100% 97%   Weight:       Height:         Temp  Min: 98 1 °F (36 7 °C)  Max: 98 1 °F (36 7 °C)  IBW (Ideal Body Weight): 63 8 kg  Height: 5' 6" (167 6 cm)  Body mass index is 34 38 kg/m²  Laboratory and Diagnostics:  Results from last 7 days   Lab Units 03/02/22  1747   WBC Thousand/uL 13 89*   HEMOGLOBIN g/dL 17 0   HEMATOCRIT % 48 3   PLATELETS Thousands/uL 355   NEUTROS PCT % 78*   MONOS PCT % 5     Results from last 7 days   Lab Units 03/02/22  1747   SODIUM mmol/L 139   POTASSIUM mmol/L 3 3*   CHLORIDE mmol/L 102   CO2 mmol/L 20*   ANION GAP mmol/L 17*   BUN mg/dL 16   CREATININE mg/dL 1 17   CALCIUM mg/dL 8 9   GLUCOSE RANDOM mg/dL 165*   ALT U/L 33   AST U/L 19   ALK PHOS U/L 88   ALBUMIN g/dL 3 9   TOTAL BILIRUBIN mg/dL 0 93     Results from last 7 days   Lab Units 03/02/22 2013   MAGNESIUM mg/dL 2 1      Results from last 7 days   Lab Units 03/02/22 2013   INR  1 00   PTT seconds 31        EKG: NSR   Imaging: I have personally reviewed pertinent reports     and I have personally reviewed pertinent films in PACS      Historical Information   Past Medical History:   Diagnosis Date    Arthritis     Hypertension      Past Surgical History:   Procedure Laterality Date    TONSILLECTOMY       Social History   Social History     Substance and Sexual Activity   Alcohol Use Yes    Comment: occassionally      Social History     Substance and Sexual Activity   Drug Use Yes    Types: Marijuana     Social History     Tobacco Use   Smoking Status Former Smoker    Types: Cigarettes   Smokeless Tobacco Never Used     Exercise History: Independent ADL  Family History:   Family History   Problem Relation Age of Onset    Heart disease Father      I have reviewed this patient's family history and commented on sigificant items within the HPI      Medications:  Current Facility-Administered Medications   Medication Dose Route Frequency    [START ON 3/3/2022] aspirin chewable tablet 81 mg  81 mg Oral Daily    atorvastatin (LIPITOR) tablet 80 mg  80 mg Oral QPM    [START ON 3/3/2022] heparin (porcine) subcutaneous injection 5,000 Units  5,000 Units Subcutaneous Q8H [facility]    nitroglycerin (NITROSTAT) SL tablet 0 4 mg  0 4 mg Sublingual Q5 Min PRN    potassium chloride (K-DUR,KLOR-CON) CR tablet 40 mEq  40 mEq Oral Once    potassium chloride 20 mEq IVPB (premix)  20 mEq Intravenous Once    sodium chloride (PF) 0 9 % injection 3 mL  3 mL Intravenous Q1H PRN    sodium chloride 0 9 % infusion  100 mL/hr Intravenous Continuous    [START ON 3/3/2022] ticagrelor (BRILINTA) tablet 90 mg  90 mg Oral Q12H [facility] medications:  None     Allergies:  No Known Allergies    ------------------------------------------------------------------------------------------------------------  Anticipated Length of Stay is > 2 midnights    Care Time Delivered:   No Critical Care time spent       ISRAEL Zarate        Portions of the record may have been created with voice recognition software  Occasional wrong word or "sound a like" substitutions may have occurred due to the inherent limitations of voice recognition software    Read the chart carefully and recognize, using context, where substitutions have occurred

## 2022-03-03 NOTE — PLAN OF CARE
Problem: PAIN - ADULT  Goal: Verbalizes/displays adequate comfort level or baseline comfort level  Description: Interventions:  - Encourage patient to monitor pain and request assistance  - Assess pain using appropriate pain scale  - Administer analgesics based on type and severity of pain and evaluate response  - Implement non-pharmacological measures as appropriate and evaluate response  - Consider cultural and social influences on pain and pain management  - Notify physician/advanced practitioner if interventions unsuccessful or patient reports new pain  Outcome: Progressing     Problem: INFECTION - ADULT  Goal: Absence or prevention of progression during hospitalization  Description: INTERVENTIONS:  - Assess and monitor for signs and symptoms of infection  - Monitor lab/diagnostic results  - Monitor all insertion sites, i e  indwelling lines, tubes, and drains  - Monitor endotracheal if appropriate and nasal secretions for changes in amount and color  - [place] appropriate cooling/warming therapies per order  - Administer medications as ordered  - Instruct and encourage patient and family to use good hand hygiene technique  - Identify and instruct in appropriate isolation precautions for identified infection/condition  Outcome: Progressing  Goal: Absence of fever/infection during neutropenic period  Description: INTERVENTIONS:  - Monitor WBC    Outcome: Progressing     Problem: SAFETY ADULT  Goal: Patient will remain free of falls  Description: INTERVENTIONS:  - Educate patient/family on patient safety including physical limitations  - Instruct patient to call for assistance with activity   - Consult OT/PT to assist with strengthening/mobility   - Keep Call bell within reach  - Keep bed low and locked with side rails adjusted as appropriate  - Keep care items and personal belongings within reach  - Initiate and maintain comfort rounds  - Make Fall Risk Sign visible to staff  - Offer Toileting every 2 Hours, in advance of need  - Initiate/Maintain bed/chair alarm  - Obtain necessary fall risk management equipment: bed alarm  - Apply yellow socks and bracelet for high fall risk patients  - Consider moving patient to room near nurses station  Outcome: Progressing  Goal: Maintain or return to baseline ADL function  Description: INTERVENTIONS:  -  Assess patient's ability to carry out ADLs; assess patient's baseline for ADL function and identify physical deficits which impact ability to perform ADLs (bathing, care of mouth/teeth, toileting, grooming, dressing, etc )  - Assess/evaluate cause of self-care deficits   - Assess range of motion  - Assess patient's mobility; develop plan if impaired  - Assess patient's need for assistive devices and provide as appropriate  - Encourage maximum independence but intervene and supervise when necessary  - Involve family in performance of ADLs  - Assess for home care needs following discharge   - Consider OT consult to assist with ADL evaluation and planning for discharge  - Provide patient education as appropriate  Outcome: Progressing  Goal: Maintains/Returns to pre admission functional level  Description: INTERVENTIONS:  - Perform BMAT or MOVE assessment daily    - Set and communicate daily mobility goal to care team and patient/family/caregiver  - Collaborate with rehabilitation services on mobility goals if consulted  - Perform Range of Motion 2 times a day  - Reposition patient every 2 hours    - Dangle patient 2 times a day  - Stand patient 2 times a day  - Ambulate patient 2 times a day  - Out of bed to chair 2 times a day   - Out of bed for meals 2 times a day  - Out of bed for toileting  - Record patient progress and toleration of activity level   Outcome: Progressing     Problem: DISCHARGE PLANNING  Goal: Discharge to home or other facility with appropriate resources  Description: INTERVENTIONS:  - Identify barriers to discharge w/patient and caregiver  - Arrange for needed discharge resources and transportation as appropriate  - Identify discharge learning needs (meds, wound care, etc )  - Arrange for interpretive services to assist at discharge as needed  - Refer to Case Management Department for coordinating discharge planning if the patient needs post-hospital services based on physician/advanced practitioner order or complex needs related to functional status, cognitive ability, or social support system  Outcome: Progressing

## 2022-03-03 NOTE — ASSESSMENT & PLAN NOTE
· 3/2/22 Cardiac catheterization: Very large aneurysmal, tortuous, calcified RCA  100 % mid occlusion  PTCA was performed on mid RCA with a 4 0 NC balloon with a good result  Stenting was attempted but the stents were not able to track the vessel to the lesion  Proximal RCA had an 80% tubular lesion   A 4 0 x 23 mm ITZEL was deployed here  · ASA 81mg daily   · S/p 325mg loading dose (3/2/22)   · Brilinta 90mg daily   · S/p 180mg loading dose (3/2/22)  · Atorvastatin 80mg qHS   · Increased from home 40mg qHS   · Likely will begin beta blocker tomorrow   · Cardiology following as primary   · Continue continuous telemetry monitoring   · EKG now and in AM  · Echocardiogram ordered for tomorrow

## 2022-03-03 NOTE — ASSESSMENT & PLAN NOTE
· Atorvastatin 40mg qHS  · Increased from home 40mg qHS   · 3/2/22 Lipid panel:  · Total cholesterol: 159  · Triglyceride: 96  · HDL: 45  · LDL: 95

## 2022-03-03 NOTE — ASSESSMENT & PLAN NOTE
· 3/2/22 Cardiac catheterization: Very large aneurysmal, tortuous, calcified RCA  100 % mid occlusion  PTCA was performed on mid RCA with a 4 0 NC balloon with a good result  Stenting was attempted but the stents were not able to track the vessel to the lesion  Proximal RCA had an 80% tubular lesion   A 4 0 x 23 mm ITZEL was deployed here  · ASA 81mg daily   · S/p 325mg loading dose (3/2/22)   · Brilinta 90mg daily   · S/p 180mg loading dose (3/2/22)  · Atorvastatin 80mg qHS   · Increased from home 40mg qHS   · Likely will begin beta blocker today  · Cardiology following as primary   · Continue continuous telemetry monitoring   · Follow up EKG in AM  · Echocardiogram ordered

## 2022-03-03 NOTE — ASSESSMENT & PLAN NOTE
· Goal SBP < 180  · Goal MAP > 65  · Holding lisinopril 50mg daily for now given slight creatinine elevation from baseline in the setting of recent contrast load  · Resume home amlodipine 10 mg daily if BP tolerates following initiation of beta blocker   · VS per step down protocol

## 2022-03-03 NOTE — ED NOTES
RN identified ST elevations in multiple leads with repeat EKG  MD called to bedside        Camila Hendrickson RN  03/02/22 1956

## 2022-03-03 NOTE — ASSESSMENT & PLAN NOTE
· Likely leukemoid stress reaction   · History not consistent with new infection   · Follow up AM CBC  · Monitor WBC and fever curve off antibiotics

## 2022-03-03 NOTE — CONSULTS
Cardiology   Tiarra Allison 64 y o  male MRN: [id number]  Unit/Bed#: S -01 Encounter: [id number]      Reason for Consult / Principal Problem:  Inferior STEMI    Physician Requesting Consult:  Shey Torrez MD    Outpatient Cardiologist:    Assessment  1  Acute inferior STEMI  -Presented to the ED on 3/2/22 w/ c/o severe chest pain w/ radiation down his right arm associated w/ dyspnea and mild nausea  -ECG in the ED demonstrated ST elevations in leads 2, 3, and AVF with reciprocal depressions  -HS troponin levels - 72- 328  -He was loaded with Brilinta 180 mg, full-dose aspirin, and 4000 units of IV heparin   -Underwent emergent coronary angiography on 3/02  2  CAD s/p PCI / ITZEL x 1 (pRCA) and s/p balloon angioplasty (mRCA) - unable to pass stent (see cath report for further detail)  -[facility] procedure 3/2; right dominant, moderate diffuse disease throughout the RCA, moderate aneurysmal dilatation in the entire segment, proximal RCA 80% stenosis (culprit lesion), mid RCA 90% stenosis (culprit lesion); status post PCI/ITZEL x1 to the proximal RCA with 0% residual stenosis, and PTCA of the mid RCA - as a stent would not pass through the lesion due to severe tortuosity and aneurysmal dilatation- post intervention JOSEPH flow is 3, lesion had 15 mm of its length treated  -On DAPT w/ aspirin 81 mg daily, and Brilinta 90 mg q 12 hours  -On high-intensity statin - with atorvastatin 80 mg daily (dose was intensified from 40 mg daily - previously taking as an outpatient)  -No BB has been initiated thus far  3  Essential hypertension  -BP mildly elevated last recorded 150/90, HR 77    -Previously on amlodipine 10 mg daily (on hold) lisinopril 50 mg daily (on hold)  3  Dyslipidemia  -Lipid profile 3/2/2022; cholesterol 159, triglycerides 96, HDL 45, and LDL 95   -Previously on atorvastatin 40 mg daily - dose was increased to 80 mg daily this admission  4  Hypokalemia  -K +level 3 3/magnesium 2 2  5   CKD stage 2  -Baseline creatinine around 0 9- 1 0; GFR low 80's  -Creatinine level currently 1 17  Plan  -Obtain TTE imaging today   -Telemetry reviewed no significant arrhythmias  Heart rates are stable in the 70's to 80's  -DAPT x 1 yr w/ aspirin/Brilinta (will need price check)  -High-intensity statin - Lipitor intensified to 80 mg daily   -Will start metoprolol tartrate 50 mg q 12 hours  -Repeat BMP now - if renal function is stable then would restart his home lisinopril - can likely start at 20 mg (previously taking 50 mg)  -Hold Norvasc   -Replete electrolytes to maintain K +level at 4 0, magnesium 2 0   K +level 3 3/Mag 2 2 last evening-- received potassium supplementation  Rechecking BMP now   -Subcu heparin for DVT/VTE prophylaxis   -Continue monitor closely on telemetry     -Downgrade from step-down - Transfer to med [facility] [place]/telemetry    HPI: Tila Salazar 64y o  year old male with a medical history of essential hypertension, and dyslipidemia  He is a lifelong nonsmoker, admits to occasional alcohol use, and denies recreational drug use  He does have a family history significant for heart disease in his father  He did not previously follow with a cardiologist   He [facility] to the [facility] on [facility]2022 with complaints of severe CP with radiation down his right arm associated with dyspnea and mild nausea  ECG in the ED demonstrated ST elevations in the inferior leads with reciprocal depressions  A MI alert was called  He did receive Brilinta 180 mg, full-dose aspirin, and 4000 units of IV heparin  He was transferred to the cardiac cath lab for urgent coronary angiography  Please see full cath report for further details  He did undergo PCI/ITZEL deployment x1 to the proximal RCA, and balloon angioplasty to the mid RCA  Postprocedure he was transferred to the step-down unit for further hemodynamic monitoring  He had no significant events overnight      Family History:   Family History   Problem Relation Age of Onset    Heart disease Father      Historical Information   Past Medical History:   Diagnosis Date    Arthritis     Hypertension      Past Surgical History:   Procedure Laterality Date    TONSILLECTOMY       Social History   Social History     Substance and Sexual Activity   Alcohol Use Yes    Comment: occassionally      Social History     Substance and Sexual Activity   Drug Use Yes    Types: Marijuana     Social History     Tobacco Use   Smoking Status Former Smoker    Types: Cigarettes   Smokeless Tobacco Never Used     Family History:   Family History   Problem Relation Age of Onset    Heart disease Father        Review of Systems:  Review of Systems   Constitutional: Negative for chills, fatigue and fever  Eyes: Negative for visual disturbance  Respiratory: Negative for cough, chest tightness and shortness of breath  Cardiovascular: Negative for chest pain, palpitations and leg swelling  Gastrointestinal: Negative for abdominal pain  Neurological: Negative for dizziness, light-headedness and headaches  All other systems reviewed and are negative            Scheduled Meds:  Current Facility-Administered Medications   Medication Dose Route Frequency Provider Last Rate    aspirin  81 mg Oral Daily Leidy Yuan MD      atorvastatin  80 mg Oral QPM Leidy Yuan MD      heparin (porcine)  5,000 Units Subcutaneous [place], [place]      nitroglycerin  0 4 mg Sublingual Q5 Min PRN Jaqui Brown, DO      sodium chloride (PF)  3 mL Intravenous Q1H PRN Jaqui Brown, DO      ticagrelor  90 mg Oral Q12H [address] Yumiko Jo MD       Continuous Infusions:   PRN Meds: nitroglycerin    sodium chloride (PF)  all current active meds have been reviewed and current meds:   Current Facility-Administered Medications   Medication Dose Route Frequency    aspirin chewable tablet 81 mg  81 mg Oral Daily    atorvastatin (LIPITOR) tablet 80 mg 80 mg Oral QPM    heparin (porcine) subcutaneous injection 5,000 Units  5,000 Units Subcutaneous Q8H [address]    nitroglycerin (NITROSTAT) SL tablet 0 4 mg  0 4 mg Sublingual Q5 Min PRN    sodium chloride (PF) 0 9 % injection 3 mL  3 mL Intravenous Q1H PRN    ticagrelor (BRILINTA) tablet 90 mg  90 mg Oral Q12H [address]       No Known Allergies    Objective   Vitals: Blood pressure 150/90, pulse 77, temperature 98 2 °F (36 8 °C), temperature source Oral, resp  rate 18, height 5' 6" (1 676 m), weight 100 kg (221 lb 5 5 oz), SpO2 98 %  , Body mass index is 35 73 kg/m² ,   Orthostatic Blood Pressures      Most Recent Value   Blood Pressure 150/90 filed at 03/03/2022 4854   Patient Position - Orthostatic VS Lying filed at 03/03/2022 5441            Intake/Output Summary (Last 24 hours) at 3/3/2022 1057  Last data filed at 3/3/2022 1008  Gross per 24 hour   Intake 240 ml   Output 400 ml   Net -160 ml       Invasive Devices  Report    Peripheral Intravenous Line            Peripheral IV 03/02/22 Left Antecubital <1 day    Peripheral IV 03/02/22 Left Hand <1 day              Physical Exam:  Physical Exam  Vitals and nursing note reviewed  Constitutional:       General: He is not in acute distress  Appearance: He is obese  He is not diaphoretic  HENT:      Head: Normocephalic and atraumatic  Mouth/Throat:      Mouth: Mucous membranes are moist    Eyes:      General: No scleral icterus  Neck:      Comments: No JVD  Cardiovascular:      Rate and Rhythm: Normal rate and regular rhythm  Pulses: Normal pulses  Heart sounds: Normal heart sounds  No murmur heard  Pulmonary:      Effort: Pulmonary effort is normal       Breath sounds: Normal breath sounds  No wheezing or rales  Abdominal:      Palpations: Abdomen is soft  Musculoskeletal:      Cervical back: Neck supple  Right lower leg: No edema  Left lower leg: No edema  Skin:     General: Skin is warm and dry        Capillary Refill: Capillary refill takes less than 2 seconds  Comments: Hands are warm, feet are cool to the touch  Neurological:      General: No focal deficit present  Mental Status: He is alert and oriented to person, place, and time     Psychiatric:         Mood and Affect: Mood normal          Lab Results:   Recent Results (from the past 24 hour(s))   CBC and differential    Collection Time: 03/02/22  5:47 PM   Result Value Ref Range    WBC 13 89 (H) 4 31 - 10 16 Thousand/uL    RBC 5 74 (H) 3 88 - 5 62 Million/uL    Hemoglobin 17 0 12 0 - 17 0 g/dL    Hematocrit 48 3 36 5 - 49 3 %    MCV 84 82 - 98 fL    MCH 29 6 26 8 - 34 3 pg    MCHC 35 2 31 4 - 37 4 g/dL    RDW 12 2 11 6 - 15 1 %    MPV 9 6 8 9 - 12 7 fL    Platelets 429 895 - 658 Thousands/uL    nRBC 0 /100 WBCs    Neutrophils Relative 78 (H) 43 - 75 %    Immat GRANS % 0 0 - 2 %    Lymphocytes Relative 17 14 - 44 %    Monocytes Relative 5 4 - 12 %    Eosinophils Relative 0 0 - 6 %    Basophils Relative 0 0 - 1 %    Neutrophils Absolute 10 69 (H) 1 85 - 7 62 Thousands/µL    Immature Grans Absolute 0 05 0 00 - 0 20 Thousand/uL    Lymphocytes Absolute 2 36 0 60 - 4 47 Thousands/µL    Monocytes Absolute 0 71 0 17 - 1 22 Thousand/µL    Eosinophils Absolute 0 02 0 00 - 0 61 Thousand/µL    Basophils Absolute 0 06 0 00 - 0 10 Thousands/µL   Comprehensive metabolic panel    Collection Time: 03/02/22  5:47 PM   Result Value Ref Range    Sodium 139 136 - 145 mmol/L    Potassium 3 3 (L) 3 5 - 5 3 mmol/L    Chloride 102 100 - 108 mmol/L    CO2 20 (L) 21 - 32 mmol/L    ANION GAP 17 (H) 4 - 13 mmol/L    BUN 16 5 - 25 mg/dL    Creatinine 1 17 0 60 - 1 30 mg/dL    Glucose 165 (H) 65 - 140 mg/dL    Calcium 8 9 8 3 - 10 1 mg/dL    AST 19 5 - 45 U/L    ALT 33 12 - 78 U/L    Alkaline Phosphatase 88 46 - 116 U/L    Total Protein 8 2 6 4 - 8 2 g/dL    Albumin 3 9 3 5 - 5 0 g/dL    Total Bilirubin 0 93 0 20 - 1 00 mg/dL    eGFR 66 ml/min/1 73sq m   HS Troponin 0hr (reflex protocol)    Collection Time: 03/02/22  5:47 PM   Result Value Ref Range    hs TnI 0hr 12 "Refer to ACS Flowchart"- see link ng/L   Lipase    Collection Time: 03/02/22  5:47 PM   Result Value Ref Range    Lipase 38 (L) 73 - 393 u/L   Lipid panel    Collection Time: 03/02/22  8:13 PM   Result Value Ref Range    Cholesterol 159 See Comment mg/dL    Triglycerides 96 See Comment mg/dL    HDL, Direct 45 >=40 mg/dL    LDL Calculated 95 0 - 100 mg/dL    Non-HDL-Chol (CHOL-HDL) 114 mg/dl   Magnesium    Collection Time: 03/02/22  8:13 PM   Result Value Ref Range    Magnesium 2 1 1 6 - 2 6 mg/dL   Protime-INR    Collection Time: 03/02/22  8:13 PM   Result Value Ref Range    Protime 13 2 11 6 - 14 5 seconds    INR 1 00 0 84 - 1 19   APTT    Collection Time: 03/02/22  8:13 PM   Result Value Ref Range    PTT 31 23 - 37 seconds   POCT activated clotting time    Collection Time: 03/02/22  9:29 PM   Result Value Ref Range    Activated Clotting Time, i-STAT 285 (H) 89 - 137 sec    Specimen Type VENOUS    POCT activated clotting time    Collection Time: 03/02/22  9:49 PM   Result Value Ref Range    Activated Clotting Time, i-STAT 265 (H) 89 - 137 sec    Specimen Type VENOUS    HS Troponin I 4hr    Collection Time: 03/02/22 11:31 PM   Result Value Ref Range    hs TnI 4hr 328 (H) "Refer to ACS Flowchart"- see link ng/L    Delta 4hr hsTnI 316 (H) <20 ng/L   Magnesium    Collection Time: 03/03/22  6:51 AM   Result Value Ref Range    Magnesium 2 2 1 6 - 2 6 mg/dL   CBC and differential    Collection Time: 03/03/22  6:52 AM   Result Value Ref Range    WBC 12 41 (H) 4 31 - 10 16 Thousand/uL    RBC 4 97 3 88 - 5 62 Million/uL    Hemoglobin 15 3 12 0 - 17 0 g/dL    Hematocrit 42 1 36 5 - 49 3 %    MCV 85 82 - 98 fL    MCH 30 8 26 8 - 34 3 pg    MCHC 36 3 31 4 - 37 4 g/dL    RDW 12 2 11 6 - 15 1 %    MPV 9 4 8 9 - 12 7 fL    Platelets 060 941 - 816 Thousands/uL    nRBC 0 /100 WBCs    Neutrophils Relative 67 43 - 75 %    Immat GRANS % 1 0 - 2 %    Lymphocytes Relative 21 14 - 44 %    Monocytes Relative 11 4 - 12 %    Eosinophils Relative 0 0 - 6 %    Basophils Relative 0 0 - 1 %    Neutrophils Absolute 8 37 (H) 1 85 - 7 62 Thousands/µL    Immature Grans Absolute 0 08 0 00 - 0 20 Thousand/uL    Lymphocytes Absolute 2 58 0 60 - 4 47 Thousands/µL    Monocytes Absolute 1 30 (H) 0 17 - 1 22 Thousand/µL    Eosinophils Absolute 0 04 0 00 - 0 61 Thousand/µL    Basophils Absolute 0 04 0 00 - 0 10 Thousands/µL     Imaging: I have personally reviewed pertinent reports  and I have personally reviewed pertinent films in PACS  Code Status:  Level 1 full code  Epic/ Allscripts/Care Everywhere records reviewed:  Yes    * Please Note: Fluency DirectDictation voice to text software may have been used in the creation of this document   **

## 2022-03-03 NOTE — ASSESSMENT & PLAN NOTE
· Likely leukemoid stress reaction   · History not consistent with new infection   · CBC in AM   · Monitor WBC and fever curve off antibiotics

## 2022-03-03 NOTE — PROGRESS NOTES
Received pt from cath lab at approx 2230  Pt alert and oriented, denies chest pain, breathing easy to room air  TR band on right wrist, no bleeding, no tingling or numbness in fingers, good capillary refill  Placed on tele, NSR, VSS  Assessment benign except for a distended and taut abdomen, in which pt reported started yesterday and is unusual  He reports having a bm yesterday, + bowel sounds  Pt's wife was at his bedside  Pt does not know the dosages of his home meds, wife to bring info tomorrow  Started weaning TR band at approx 2315, was able to apirate 3cc over time, however, puncture started to bleed, TR band refilled with air, aspiration began again at approx 0130,1cc aspirated, no bleeding at this time   Pt resting at this time, call bell within reach,

## 2022-03-03 NOTE — PROGRESS NOTES
Emergent cardiac cath performed in the setting of an acute inferior wall MI  LAD and L Cx patent  RCA - very large aneurysmal , tortuous , calcified vessel  100 % mid occlusion  PTCA was performed on the mid RCA with a 4 0 NC balloon with a good result  Stenting was attempted but the stents were not able to track the vessel to the lesion  The proximal RCA had an 80% tubular lesion  A 4 0 x 23 mm ITZEL was deployed here  1  ASA 81 mg daily  2  Brilinta 90 mg BID  3  ECHO  4   IVFs

## 2022-03-04 VITALS
DIASTOLIC BLOOD PRESSURE: 104 MMHG | TEMPERATURE: 99 F | BODY MASS INDEX: 35.36 KG/M2 | HEIGHT: 66 IN | OXYGEN SATURATION: 99 % | SYSTOLIC BLOOD PRESSURE: 185 MMHG | WEIGHT: 220.02 LBS | RESPIRATION RATE: 20 BRPM | HEART RATE: 70 BPM

## 2022-03-04 LAB
ATRIAL RATE: 102 BPM
ATRIAL RATE: 104 BPM
ATRIAL RATE: 87 BPM
ATRIAL RATE: 95 BPM
P AXIS: 14 DEGREES
P AXIS: 23 DEGREES
P AXIS: 72 DEGREES
PR INTERVAL: 0 MS
PR INTERVAL: 170 MS
PR INTERVAL: 176 MS
PR INTERVAL: 184 MS
QRS AXIS: -30 DEGREES
QRS AXIS: 28 DEGREES
QRS AXIS: 66 DEGREES
QRS AXIS: 86 DEGREES
QRSD INTERVAL: 82 MS
QRSD INTERVAL: 86 MS
QRSD INTERVAL: 96 MS
QRSD INTERVAL: 96 MS
QT INTERVAL: 314 MS
QT INTERVAL: 316 MS
QT INTERVAL: 326 MS
QT INTERVAL: 372 MS
QTC INTERVAL: 396 MS
QTC INTERVAL: 401 MS
QTC INTERVAL: 401 MS
QTC INTERVAL: 447 MS
T WAVE AXIS: 101 DEGREES
T WAVE AXIS: 102 DEGREES
T WAVE AXIS: 74 DEGREES
T WAVE AXIS: 92 DEGREES
VENTRICULAR RATE: 87 BPM
VENTRICULAR RATE: 91 BPM
VENTRICULAR RATE: 94 BPM
VENTRICULAR RATE: 98 BPM

## 2022-03-04 PROCEDURE — 99239 HOSP IP/OBS DSCHRG MGMT >30: CPT | Performed by: INTERNAL MEDICINE

## 2022-03-04 PROCEDURE — 99232 SBSQ HOSP IP/OBS MODERATE 35: CPT | Performed by: INTERNAL MEDICINE

## 2022-03-04 PROCEDURE — 93010 ELECTROCARDIOGRAM REPORT: CPT | Performed by: INTERNAL MEDICINE

## 2022-03-04 RX ORDER — METOPROLOL TARTRATE 50 MG/1
50 TABLET, FILM COATED ORAL EVERY 12 HOURS SCHEDULED
Qty: 60 TABLET | Refills: 0 | Status: SHIPPED | OUTPATIENT
Start: 2022-03-04 | End: 2022-03-28 | Stop reason: SDUPTHER

## 2022-03-04 RX ORDER — CLOPIDOGREL BISULFATE 75 MG/1
600 TABLET ORAL ONCE
Status: COMPLETED | OUTPATIENT
Start: 2022-03-04 | End: 2022-03-04

## 2022-03-04 RX ORDER — ATORVASTATIN CALCIUM 80 MG/1
80 TABLET, FILM COATED ORAL EVERY EVENING
Qty: 60 TABLET | Refills: 0 | Status: SHIPPED | OUTPATIENT
Start: 2022-03-04 | End: 2022-03-10 | Stop reason: SDUPTHER

## 2022-03-04 RX ORDER — CLOPIDOGREL BISULFATE 75 MG/1
75 TABLET ORAL DAILY
Qty: 30 TABLET | Refills: 11 | Status: SHIPPED | OUTPATIENT
Start: 2022-03-05

## 2022-03-04 RX ORDER — CLOPIDOGREL BISULFATE 75 MG/1
75 TABLET ORAL DAILY
Status: DISCONTINUED | OUTPATIENT
Start: 2022-03-05 | End: 2022-03-04 | Stop reason: HOSPADM

## 2022-03-04 RX ORDER — ASPIRIN 81 MG/1
81 TABLET, CHEWABLE ORAL DAILY
Qty: 60 TABLET | Refills: 0 | Status: SHIPPED | OUTPATIENT
Start: 2022-03-04 | End: 2022-06-02

## 2022-03-04 RX ADMIN — TICAGRELOR 90 MG: 90 TABLET ORAL at 08:44

## 2022-03-04 RX ADMIN — LISINOPRIL 40 MG: 20 TABLET ORAL at 12:29

## 2022-03-04 RX ADMIN — ASPIRIN 81 MG CHEWABLE TABLET 81 MG: 81 TABLET CHEWABLE at 08:44

## 2022-03-04 RX ADMIN — HEPARIN SODIUM 5000 UNITS: 5000 INJECTION INTRAVENOUS; SUBCUTANEOUS at 05:44

## 2022-03-04 RX ADMIN — METOPROLOL TARTRATE 50 MG: 50 TABLET, FILM COATED ORAL at 08:44

## 2022-03-04 RX ADMIN — CLOPIDOGREL BISULFATE 600 MG: 75 TABLET ORAL at 12:29

## 2022-03-04 NOTE — DISCHARGE INSTR - AVS FIRST PAGE
Dear Tila Salazar,     It was our pleasure to care for you here at [facility], [facility]  It is our hope that we were always able to exceed the expected standards for your care during your stay  You were hospitalized due to chest pain  You were cared for on the second floor by Ceasar Angeles MD under the service of Chaz Curtis MD with the [facility] who covers for your primary care physician (PCP), ISRAEL Fung, while you were hospitalized  If you have any questions or concerns related to this hospitalization, you may contact us at [phone]  For follow up as well as any medication refills, we recommend that you follow up with your primary care physician  A registered nurse will reach out to you by phone within a few days after your discharge to answer any additional questions that you may have after going home  However, at this time we provide for you here, the most important instructions / recommendations at discharge:     Notable Medication Adjustments -   Please take lisinopril 40 mg daily  Testing Required after Discharge -   None  Important follow up information -   Please go to your appointment with Cardiology on March 10, 2022  Please set up an appointment with your primary care doctor as soon as possible within 1 week of discharge  Other Instructions -   Please take Plavix 75 mg once a day  Please take aspirin 81 mg once a day  Please take Lipitor 40 mg daily at bedtime  Please take metoprolol 50 mg twice a day  Please review this entire after visit summary as additional general instructions including medication list, appointments, activity, diet, any pertinent wound care, and other additional recommendations from your care team that may be provided for you        Sincerely,     Ceasar Angeles MD

## 2022-03-04 NOTE — ASSESSMENT & PLAN NOTE
· S/p PCI/ITZEL (pRCA) on 3/2  · DAPT with plavix and aspirin (Brilinta price not feasible for patient); plavix loading done prior to discharge  · Continue atorvastatin 80 mg daily at bedtime  · Continue metoprolol 50 mg b i d    · Lisinopril 40 mg daily  · Referral for cardiac rehab on discharge  · Has follow up with Cardiology in outpatient setting on 03/10/2022 (also has follow up BMP in 3 days)

## 2022-03-04 NOTE — PROGRESS NOTES
General Cardiology   Progress Note -  [facility] One   Carline Corrales 64 y o  male MRN: [id number]    Unit/Bed#: S -01 Encounter: [id number]    Assessment  1  Acute inferior STEMI  -Presented to the ED on 3/2/22 w/ c/o severe chest pain w/ radiation down his right arm associated w/ dyspnea and mild nausea  -ECG in the ED demonstrated ST elevations in leads 2, 3, and AVF with reciprocal depressions  -HS troponin levels - 72- 328  -He was loaded with Brilinta 180 mg, full-dose aspirin, and 4000 units of IV heparin on 3/2/22  -Underwent emergent coronary angiography on 3/2/22  2  CAD s/p PCI / ITZEL x 1 (pRCA) and s/p balloon angioplasty (mRCA) - unable to pass stent (see cath report for further detail)  -Asymptomatic currently  -[facility] procedure 3/2; right dominant, moderate diffuse disease throughout the RCA, moderate aneurysmal dilatation in the entire segment, proximal RCA 80% stenosis (culprit lesion), mid RCA 90% stenosis (culprit lesion); status post PCI/ITZEL x1 to the proximal RCA with 0% residual stenosis, and PTCA of the mid RCA - as a stent would not pass through the lesion due to severe tortuosity and aneurysmal dilatation- post intervention JOSEPH flow is 3, lesion had 15 mm of its length treated  -On DAPT w/ aspirin 81 mg daily, and Brilinta 90 mg q 12 hours  -On high-intensity statin - with atorvastatin 80 mg daily (dose was intensified from 40 mg daily - previously taking as an outpatient)  -On metoprolol tartrate 50 mg q12h  3  Preserved LV systolic function   -Compensated  -TTE 3/3/22; LVEF 60-65%, basal inferolateral hypokinesis with possible basal inferior hypokinesis, RV normal size/systolic function, no significant valve disease  4  Essential hypertension  -AVG /84, last recorded at 124/76, HR 66  -Outpatient BP regimen: amlodipine 10 mg daily and lisinopril 50 mg daily  -Inpatient BP regimen: metoprolol tartrate 50 mg q12h and lisinopril 40 mg daily  5   Dyslipidemia  -Lipid profile 3/2/2022; cholesterol 159, triglycerides 96, HDL 45, and LDL 95   -Previously on atorvastatin 40 mg daily - dose was increased to 80 mg daily this admission  5  CKD stage 2  -Baseline creatinine around 0 9- 1 0; GFR low 80's  -Creatinine level currently 0 84     Plan  -Pt is doing well today w/o any significant cardiac complaints  He is anxious to go home    -Telemetry reviewed no significant arrhythmias  Heart rates are stable in the 60's to 70's  -DAPT x 1 yr  Had been on asa/Brilinta (however cost of Brilinta was prohibitive for the patient after having been assessed by his pharmacy); will load w/clopidogrel 600 mg now and then start clopidogrel 75 mg daily tomorrow    -High-intensity statin - Lipitor intensified to 80 mg daily   -Continue metoprolol tartrate 50 mg q 12 hours   -Continue lisinopril 40 mg daily   -DC Norvasc on discharge  -BMP 3 days after discharge  -Referral to cardiac rehab placed    -Outpatient follow-up arranged w/Radha Galvez on 3/10/22    Subjective  Review of Systems   Constitutional: Negative for chills, fever and malaise/fatigue  Eyes: Negative for visual disturbance  Cardiovascular: Negative for chest pain, dyspnea on exertion, leg swelling, orthopnea and palpitations  Respiratory: Negative for cough and shortness of breath  Gastrointestinal: Negative for abdominal pain  Neurological: Negative for dizziness, headaches and light-headedness  Objective:   Physical Exam  Vitals and nursing note reviewed  Constitutional:       General: He is not in acute distress  Appearance: He is obese  He is not diaphoretic  HENT:      Head: Normocephalic and atraumatic  Mouth/Throat:      Mouth: Mucous membranes are moist    Eyes:      General: No scleral icterus  Cardiovascular:      Rate and Rhythm: Normal rate and regular rhythm  Pulses: Normal pulses  Heart sounds: Normal heart sounds  No murmur heard        Pulmonary:      Effort: Pulmonary effort is normal       Breath sounds: Normal breath sounds  No wheezing or rales  Abdominal:      Palpations: Abdomen is soft  Musculoskeletal:      Cervical back: Neck supple  Right lower leg: No edema  Left lower leg: No edema  Skin:     General: Skin is warm and dry  Capillary Refill: Capillary refill takes less than 2 seconds  Comments: Right radial cath site, surrounding area soft w/o hematoma, NVE WNL   Neurological:      General: No focal deficit present  Mental Status: He is alert and oriented to person, place, and time  Psychiatric:         Mood and Affect: Mood normal          Vitals: Blood pressure 124/76, pulse 70, temperature 98 3 °F (36 8 °C), temperature source Oral, resp  rate 18, height 5' 6" (1 676 m), weight 99 8 kg (220 lb 0 3 oz), SpO2 95 %  ,     Body mass index is 35 51 kg/m²  ,   Systolic (97THB), CQT:812 , Min:114 , DQI:177     Diastolic (15SWE), CQL:43, Min:63, Max:97      Intake/Output Summary (Last 24 hours) at 3/4/2022 1022  Last data filed at 3/4/2022 0844  Gross per 24 hour   Intake 848 33 ml   Output 800 ml   Net 48 33 ml     Weight (last 2 days)     Date/Time Weight    03/04/22 0258 99 8 (220 02)    03/03/22 1303 100 (221)    03/03/22 0249 100 (221 34)    03/02/22 1741 96 6 (213)          LABORATORY RESULTS      CBC with diff:   Results from last 7 days   Lab Units 03/03/22  0652 03/02/22  1747   WBC Thousand/uL 12 41* 13 89*   HEMOGLOBIN g/dL 15 3 17 0   HEMATOCRIT % 42 1 48 3   MCV fL 85 84   PLATELETS Thousands/uL 299 355   MCH pg 30 8 29 6   MCHC g/dL 36 3 35 2   RDW % 12 2 12 2   MPV fL 9 4 9 6   NRBC AUTO /100 WBCs 0 0       CMP:  Results from last 7 days   Lab Units 03/03/22  1206 03/03/22  0651 03/02/22  1747   POTASSIUM mmol/L 3 7 3 9 3 3*   CHLORIDE mmol/L 108 108 102   CO2 mmol/L 22 21 20*   BUN mg/dL 13 14 16   CREATININE mg/dL 0 84 0 77 1 17   CALCIUM mg/dL 8 2* 8 2* 8 9   AST U/L  --   --  19   ALT U/L  --   --  33   ALK PHOS U/L  --   --  88 EGFR ml/min/1 73sq m 94 97 66       BMP:  Results from last 7 days   Lab Units 03/03/22  1206 03/03/22  0651 03/02/22  1747   POTASSIUM mmol/L 3 7 3 9 3 3*   CHLORIDE mmol/L 108 108 102   CO2 mmol/L 22 21 20*   BUN mg/dL 13 14 16   CREATININE mg/dL 0 84 0 77 1 17   CALCIUM mg/dL 8 2* 8 2* 8 9       No results found for: NTBNP     Results from last 7 days   Lab Units 03/03/22  0651 03/02/22 2013   MAGNESIUM mg/dL 2 2 2 1       Results from last 7 days   Lab Units 03/03/22 0651   HEMOGLOBIN A1C % 5 6             Results from last 7 days   Lab Units 03/02/22 2013   INR  1 00       Lipid Profile:   No results found for: CHOL  Lab Results   Component Value Date    HDL 45 03/02/2022     Lab Results   Component Value Date    LDLCALC 95 03/02/2022     Lab Results   Component Value Date    TRIG 96 03/02/2022       Cardiac testing:   No results found for this or any previous visit  No results found for this or any previous visit  No results found for this or any previous visit  No valid procedures specified  No results found for this or any previous visit        Meds/Allergies   all current active meds have been reviewed and current meds:   Current Facility-Administered Medications   Medication Dose Route Frequency    aspirin chewable tablet 81 mg  81 mg Oral Daily    atorvastatin (LIPITOR) tablet 80 mg  80 mg Oral QPM    heparin (porcine) subcutaneous injection 5,000 Units  5,000 Units Subcutaneous Q8H [facility]    lisinopril (ZESTRIL) tablet 40 mg  40 mg Oral Daily    metoprolol tartrate (LOPRESSOR) tablet 50 mg  50 mg Oral Q12H [facility]    nitroglycerin (NITROSTAT) SL tablet 0 4 mg  0 4 mg Sublingual Q5 Min PRN    ondansetron (ZOFRAN) injection 4 mg  4 mg Intravenous Q6H PRN    sodium chloride (PF) 0 9 % injection 3 mL  3 mL Intravenous Q1H PRN    ticagrelor (BRILINTA) tablet 90 mg  90 mg Oral Q12H [facility]            EKG personally reviewed by ISRAEL Wong    Assessment:  Principal Problem:    ST elevation myocardial infarction involving right coronary artery (HCC)  Active Problems:    Hypertension    HLD (hyperlipidemia)    Leukocytosis    Hyperglycemia    Hypokalemia    Counseling / Coordination of Care  Total floor / unit time spent today 20 minutes  Greater than 50% of total time was spent with the patient and / or family counseling and / or coordination of care  ** Please Note: Dragon 360 Dictation voice to text software may have been used in the creation of this document   **

## 2022-03-04 NOTE — DISCHARGE SUMMARY
[facility]  Discharge- Tiffanie Sweet 1960, 64 y o  male MRN: [id number]  Unit/Bed#: S -01 Encounter: [id number]  Primary Care Provider: ISRAEL Moy   Date and time admitted to hospital: 3/2/2022  7:49 PM    * ST elevation myocardial infarction involving right coronary artery [facility])  Assessment & Plan  · S/p PCI/ITZEL (pRCA) on 3/2  · DAPT with plavix and aspirin (Brilinta price not feasible for patient); plavix loading done prior to discharge  · Continue atorvastatin 80 mg daily at bedtime  · Continue metoprolol 50 mg b i d  · Lisinopril 40 mg daily  · Referral for cardiac rehab on discharge  · Has follow up with Cardiology in outpatient setting on 03/10/2022 (also has follow up BMP in 3 days)    HLD (hyperlipidemia)  Assessment & Plan  · Continue high-dose statin    Hypertension  Assessment & Plan  · As outlined above        Medical Problems             Resolved Problems  Date Reviewed: 3/4/2022    None              Discharging Resident: Jair Solis MD  Discharging Attending: No att  providers found  PCP: Orlando Moy  Admission Date:   Admission Orders (From admission, onward)     Ordered        03/02/22 2225  Inpatient Admission  Once                      Discharge Date: 03/04/22    Consultations During Hospital Stay:  · Cardiology    Procedures Performed:   · S/p PCI/ ITZEL x 1 (proximal RCA)    Significant Findings / Test Results:   XR chest 1 view portable   Final Result by Lum Landau, MD (03/03 0492)      No acute cardiopulmonary disease                    Workstation performed: [id number]               Incidental Findings:   · none    Test Results Pending at Discharge (will require follow up):  · none     Outpatient Tests Requested:  · BMP in 3 days -- follow up results with Cardiology    Complications:  none    Reason for Admission:  Chest pain    Hospital Course:   Tiffanie Sweet is a 64 y o  male patient who originally presented to the hospital on 3/2/2022 due to chest pain  Patient came in with chest pain, on arrival to the hospital was found to have acute inferior STEMI  Left heart catheterization done on 03/02, found to have very large aneurysmal calcified vessel with 100% mid occlusion of RCA  ITZEL deployed at proximal RCA  Patient was monitored in the ICU and later transferred out to [facility] floor  Patient started on dual anti-platelet therapy with Plavix and aspirin  High-dose statin as well as metoprolol  He has an appointment with Cardiology in outpatient setting on 03/10/2022  Please see above list of diagnoses and related plan for additional information  Condition at Discharge: stable    Discharge Day Visit / Exam:   Subjective:  No complaints today  Vitals: Blood Pressure: (!) 185/104 (03/04/22 1138)  Pulse: 70 (03/04/22 1138)  Temperature: 99 °F (37 2 °C) (03/04/22 1138)  Temp Source: Oral (03/04/22 1138)  Respirations: 20 (03/04/22 1138)  Height: 5' 6" (167 6 cm) (03/03/22 1303)  Weight - Scale: 99 8 kg (220 lb 0 3 oz) (03/04/22 0258)  SpO2: 99 % (03/04/22 1138)     Exam:   Physical Exam  Vitals reviewed  Constitutional:       General: He is not in acute distress  Appearance: He is not ill-appearing  Eyes:      General: No scleral icterus  Cardiovascular:      Rate and Rhythm: Normal rate  Heart sounds: No murmur heard  Pulmonary:      Effort: Pulmonary effort is normal  No respiratory distress  Breath sounds: No wheezing or rales  Abdominal:      General: Bowel sounds are normal       Tenderness: There is no abdominal tenderness  Musculoskeletal:         General: No swelling  Skin:     General: Skin is warm  Coloration: Skin is not jaundiced  Neurological:      General: No focal deficit present  Mental Status: He is alert and oriented to person, place, and time  Mental status is at baseline              Discussion with Family: Patient declined call to   Discharge instructions/Information to patient and family:   See after visit summary for information provided to patient and family  Provisions for Follow-Up Care:  See after visit summary for information related to follow-up care and any pertinent home health orders  Disposition:   Home    Planned Readmission:  None    Discharge Medications:  See after visit summary for reconciled discharge medications provided to patient and/or family        **Please Note: This note may have been constructed using a voice recognition system**

## 2022-03-07 NOTE — UTILIZATION REVIEW
Notification of Discharge   This is a Notification of Discharge from our facility [address]  Please be advised that this patient has been discharge from our facility  Below you will find the admission and discharge date and time including the patients disposition  UTILIZATION REVIEW CONTACT:  Tavares Alcaal MA  Utilization   Network Utilization Review Department  Phone: [phone] x carefully listen to the prompts  All voicemails are confidential   Email: [email or web address]     PHYSICIAN ADVISORY SERVICES:  FOR [id number] REVIEW - MEDICAL NECESSITY DENIAL  Phone: [phone]  Fax: [phone]  Email: [email or web address]     PRESENTATION DATE: 3/2/2022  7:49 PM  OBERVATION ADMISSION DATE:   INPATIENT ADMISSION DATE: 3/2/22 10:25 PM   DISCHARGE DATE: 3/4/2022 12:52 PM  DISPOSITION: Home/Self Care Home/Self Care      IMPORTANT INFORMATION:  Send all requests for admission clinical reviews, approved or denied determinations and any other requests to dedicated fax number below belonging to the campus where the patient is receiving treatment   List of dedicated fax numbers:  [address] DENIALS (Administrative/Medical Necessity) [phone]   [address] (Maternity/NICU/Pediatrics) [phone]   [facility] [phone]   [address] [facility] [phone]   [address] [place] [address] [phone]   2000 [facility] [address] [place] [address],[address] [phone]   [facility]  [phone]   [address] [facility], [address]  [address] [facility] [address] [facility] [phone]

## 2022-03-08 NOTE — PROGRESS NOTES
Cardiology  MI Follow Up   Office Visit Note  Grady Hawthorne   64 y o    male   MRN: [id number]  [address]  [address]  [address] [place] [id number] [phone]    PCP: ISRAEL Alamo  Cardiologist: will be Dr Eric Moran            Summary of recommendations  Heart healthy diet  Educational information provided  Lower extremity arterial duplex due to bilateral leg and back pain  Cardiac rehab has been prescribed recommended  Nonfasting CBC, BMP   Lipid profile 8 weeks  Medical adherence to dual antiplatelet therapy reinforced  Periodic home blood pressure monitoring  Goal /80 or less  Return to work note provided, after an additional week  He works in a group home  Follow up will be scheduled with Dr Eric Moran 6 weeks  Sublingual nitroglycerin with instructions for use  CR screening:  Up-to-date -colonoscopy 4/20/21          Assessment/plan  Inferior STEMI, recent adm 3/2-3/4/22  CAD; S/P PCI/ITZEL x1 to the 80% proximal RCA  POBA to the 90% mid RCA; stent attempted, unsuccessful due to tortuous anatomy  30% residual mid RCA  RCA was very large, aneurysmal, tortuous, calcified  Otherwise, 10% LM, and mild diffuse CAD of the LAD, circumflex and moderate disease of the RCA, medically managed   On aspirin 81 mg daily, Plavix 75 mg daily, a high-intensity statin, beta-blocker  · Dual antiplatelet therapy beyond 1 year due to the angioplastied aneurysmal mid RCA   Cardiac rehab has been prescribed and recommended   Adherence to dual antiplatelet therapy reinforced  EF 60-65%  inferolateral hypokinesis with possible basal inferior hypokinesis  Hypertension, essential   /86 lisinopril 40 mg daily, metoprolol tartrate 50 mg q 12  Previously on amlodipine 10 which was discontinued in favor of metoprolol  He was also taking lisinopril, a 40 mg plus a 10 mg pill  The 10 mg pill was held  Hyperlipidemia, on atorvastatin 80 mg daily   3/2/22: LDL 95 Non      Goal LDL less than 70  Heart healthy diet  Reassess 8 weeks   Prior to admission was on atorvastatin 40 mg daily  Former tobacco use , many years ago  Arthritis  Medical marijuana use for his arthritis  He reports he also uses white willow bark  I asked him to hold off on this  Cardiac testing   TTE 3/3/22 EF 60-65%  inferolateral hypokinesis with possible basal inferior hypokinesis  Otherwise normal wall motion  Diastolic function is normal    Cardiac catheterization 3/2/22  ·  Prox RCA lesion is 80% stenosed  Culprit lesion  Mid RCA lesion is 90% stenosed  Culprit lesion  Culprit for clinical presentation  · S/p PCI/ITZEL x 2 RCA  Stenting of the mid RCA was attempted but the stent would not pass  · Mid LM:  Focal, 10% stenosis  Mild diffuse disease throughout the LAD, left circumflex  Moderate disease throughout the RCA          HPI  Milta Halsted is a 63 yo male with essential hypertension, dyslipidemia  He is a former tobacco user in remission  Prior history CAD heart failure nor arrhythmia  Adm 3/2-3/4/22  CC:  Severe chest pain, radiation down his right arm associated with dyspnea and mild nausea  EKG showed inferior ST elevation  MI alert was called  He underwent urgent angiography  This demonstrated a culprit 90% mid RCA lesion as well as an 80% proximal RCA lesion  He underwent PCI/ITZEL to the proximal RCA  Stenting of the mid RCA was attempted but unsuccessful due to tortuous anatomy  He underwent POBA, with residual 30% stenosis of this area  Otherwise there was a 10% stenosis of the mid LM, mild diffuse CAD of the LAD, and circumflex  Moderate disease of the RCA  He was placed on dual antiplatelet therapy with aspirin and Plavix  His statin therapy was intensified, atorvastatin was increased to 80 mg daily for baseline LDL of 95 non   Is also placed on metoprolol tartrate 50 mg q 12 in addition to lisinopril 40 mg daily    Outpatient amlodipine was discontinued  His echocardiogram showed preserved LV function with inferolateral hypokinesis with possible basal inferior hypokinesis otherwise normal wall motion and no significant valve disease  Additionally, he had low pulses in his lower extremities  He has had chronic back pain, and lower extremity pain which inhibits his exercise  CANDICE recommended as an outpatient    3/10/22  Follow-up   He is accompanied by his wife  MIKE: he feels good ;no chest pain, shortness of breath, lightheadedness or dizziness  He does have persistent chronic back and leg pain  He has a fair amount of arthritis  Compliant with his medications  /86  Continue to monitor her blood pressures, log and bring to the visit  Together we reviewed his cardiac angiography diagram  Reviewed his echocardiogram   He is aware of the findings  Reviewed his lipids  Recommended a heart healthy diet  In the past he was eating foods such as cheese steaks and [employer] fries   He is amenable to labs today  Will also get lower extremity CANDICE  Follow-up lipids 8 weeks  Meds were refilled  He was provided a prescription for sublingual nitroglycerin with instructions for use  Follow-up with his cardiologist in 6 weeks  Work note was provided today, to be out for an additional week before returning  I have spent 40 minutes with Patient and family today in which greater than 50% of this time was spent in counseling/coordination of care regarding Intructions for management, Patient and family education, Importance of tx compliance and Risk factor reductions    Assessment  Diagnoses and all orders for this visit:    Hospital discharge follow-up    ST elevation myocardial infarction involving right coronary artery [facility])    Coronary artery disease involving native coronary artery of native heart without angina pectoris    Status post insertion of drug eluting coronary artery stent    Pure hypercholesterolemia    Primary hypertension          Past Medical History:   Diagnosis Date    Arthritis     Hypertension        Review of Systems   Constitutional: Negative for chills  Cardiovascular: Negative for chest pain, claudication, cyanosis, dyspnea on exertion, irregular heartbeat, leg swelling, near-syncope, orthopnea, palpitations, paroxysmal nocturnal dyspnea and syncope  Respiratory: Negative for cough and shortness of breath  Musculoskeletal: Positive for back pain  Chronic back and leg pain   Gastrointestinal: Negative for heartburn and nausea  Neurological: Negative for dizziness, focal weakness, headaches, light-headedness and weakness  All other systems reviewed and are negative  No Known Allergies        Current Outpatient Medications:     aspirin 81 mg chewable tablet, Chew 1 tablet (81 mg total) daily, Disp: 60 tablet, Rfl: 0    atorvastatin (LIPITOR) 80 mg tablet, Take 1 tablet (80 mg total) by mouth every evening, Disp: 60 tablet, Rfl: 0    clopidogrel (PLAVIX) 75 mg tablet, Take 1 tablet (75 mg total) by mouth daily, Disp: 30 tablet, Rfl: 11    lisinopril (ZESTRIL) 40 mg tablet, Take 40 mg by mouth daily, Disp: , Rfl:     metoprolol tartrate (LOPRESSOR) 50 mg tablet, Take 1 tablet (50 mg total) by mouth every 12 (twelve) hours, Disp: 60 tablet, Rfl: 0        Social History     Socioeconomic History    Marital status: /Civil Union     Spouse name: Not on file    Number of children: Not on file    Years of education: Not on file    Highest education level: Not on file   Occupational History    Not on file   Tobacco Use    Smoking status: Former Smoker     Types: Cigarettes    Smokeless tobacco: Never Used   Substance and Sexual Activity    Alcohol use: Yes     Comment: occassionally     Drug use: Yes     Types: Marijuana    Sexual activity: Not on file   Other Topics Concern    Not on file   Social History Narrative    Not on file     Social Determinants of Health Financial Resource Strain: Not on file   Food Insecurity: Not on file   Transportation Needs: Not on file   Physical Activity: Not on file   Stress: Not on file   Social Connections: Not on file   Intimate Partner Violence: Not on file   Housing Stability: Not on file       Family History   Problem Relation Age of Onset    Heart disease Father        Physical Exam  Vitals and nursing note reviewed  Constitutional:       General: He is not in acute distress  HENT:      Head: Normocephalic and atraumatic  Eyes:      Conjunctiva/sclera: Conjunctivae normal    Cardiovascular:      Rate and Rhythm: Normal rate and regular rhythm  Pulses: Intact distal pulses  Heart sounds: Normal heart sounds  Pulmonary:      Effort: Pulmonary effort is normal       Breath sounds: Normal breath sounds  Abdominal:      General: Bowel sounds are normal       Palpations: Abdomen is soft  Musculoskeletal:         General: Normal range of motion  Cervical back: Normal range of motion and neck supple  Skin:     General: Skin is warm and dry  Neurological:      Mental Status: He is alert and oriented to person, place, and time  Vitals: There were no vitals taken for this visit  Wt Readings from Last 3 Encounters:   03/04/22 99 8 kg (220 lb 0 3 oz)         Labs & Results:  Lab Results   Component Value Date    WBC 12 41 (H) 03/03/2022    HGB 15 3 03/03/2022    HCT 42 1 03/03/2022    MCV 85 03/03/2022     03/03/2022     No results found for: BNP  No components found for: CHEM  No results found for: CKTOTAL, TROPONINI, TROPONINT, CKMBINDEX  No results found for this or any previous visit  No results found for this or any previous visit  This note was completed in part utilizing [employer] direct voice recognition software     Grammatical errors, random word insertion, spelling mistakes, and incomplete sentences may be an occasional consequence of the system secondary to software limitations, ambient noise and hardware issues  At the time of dictation, efforts were made to edit, clarify and /or correct errors  Please read the chart carefully and recognize, using context, where substitutions have occurred    If you have any questions or concerns about the context, text or information contained within the body of this dictation, please contact myself, the provider, for further clarification

## 2022-03-10 ENCOUNTER — APPOINTMENT (OUTPATIENT)
Dept: LAB | Facility: CLINIC | Age: 62
End: 2022-03-10
Payer: COMMERCIAL

## 2022-03-10 ENCOUNTER — OFFICE VISIT (OUTPATIENT)
Dept: CARDIOLOGY CLINIC | Facility: CLINIC | Age: 62
End: 2022-03-10
Payer: COMMERCIAL

## 2022-03-10 VITALS
SYSTOLIC BLOOD PRESSURE: 136 MMHG | WEIGHT: 215.13 LBS | OXYGEN SATURATION: 98 % | DIASTOLIC BLOOD PRESSURE: 86 MMHG | RESPIRATION RATE: 18 BRPM | HEART RATE: 56 BPM | BODY MASS INDEX: 34.57 KG/M2 | HEIGHT: 66 IN

## 2022-03-10 DIAGNOSIS — I10 PRIMARY HYPERTENSION: ICD-10-CM

## 2022-03-10 DIAGNOSIS — I21.11 ST ELEVATION MYOCARDIAL INFARCTION INVOLVING RIGHT CORONARY ARTERY (HCC): ICD-10-CM

## 2022-03-10 DIAGNOSIS — I21.19 ACUTE MI, INFERIOR WALL (HCC): ICD-10-CM

## 2022-03-10 DIAGNOSIS — E78.00 PURE HYPERCHOLESTEROLEMIA: ICD-10-CM

## 2022-03-10 DIAGNOSIS — I25.10 CORONARY ARTERY DISEASE INVOLVING NATIVE CORONARY ARTERY OF NATIVE HEART WITHOUT ANGINA PECTORIS: ICD-10-CM

## 2022-03-10 DIAGNOSIS — Z95.5 STATUS POST INSERTION OF DRUG ELUTING CORONARY ARTERY STENT: ICD-10-CM

## 2022-03-10 DIAGNOSIS — Z09 HOSPITAL DISCHARGE FOLLOW-UP: Primary | ICD-10-CM

## 2022-03-10 DIAGNOSIS — M79.605 LEG PAIN, BILATERAL: ICD-10-CM

## 2022-03-10 DIAGNOSIS — M79.604 LEG PAIN, BILATERAL: ICD-10-CM

## 2022-03-10 LAB
ANION GAP SERPL CALCULATED.3IONS-SCNC: 9 MMOL/L (ref 4–13)
BUN SERPL-MCNC: 16 MG/DL (ref 5–25)
CALCIUM SERPL-MCNC: 9 MG/DL (ref 8.3–10.1)
CHLORIDE SERPL-SCNC: 108 MMOL/L (ref 100–108)
CO2 SERPL-SCNC: 26 MMOL/L (ref 21–32)
CREAT SERPL-MCNC: 1.06 MG/DL (ref 0.6–1.3)
ERYTHROCYTE [DISTWIDTH] IN BLOOD BY AUTOMATED COUNT: 12.1 % (ref 11.6–15.1)
GFR SERPL CREATININE-BSD FRML MDRD: 75 ML/MIN/1.73SQ M
GLUCOSE SERPL-MCNC: 104 MG/DL (ref 65–140)
HCT VFR BLD AUTO: 46.7 % (ref 36.5–49.3)
HGB BLD-MCNC: 16.2 G/DL (ref 12–17)
MCH RBC QN AUTO: 29.9 PG (ref 26.8–34.3)
MCHC RBC AUTO-ENTMCNC: 34.7 G/DL (ref 31.4–37.4)
MCV RBC AUTO: 86 FL (ref 82–98)
PLATELET # BLD AUTO: 314 THOUSANDS/UL (ref 149–390)
PMV BLD AUTO: 9.4 FL (ref 8.9–12.7)
POTASSIUM SERPL-SCNC: 4.7 MMOL/L (ref 3.5–5.3)
RBC # BLD AUTO: 5.42 MILLION/UL (ref 3.88–5.62)
SODIUM SERPL-SCNC: 143 MMOL/L (ref 136–145)
WBC # BLD AUTO: 10.06 THOUSAND/UL (ref 4.31–10.16)

## 2022-03-10 PROCEDURE — 99215 OFFICE O/P EST HI 40 MIN: CPT | Performed by: INTERNAL MEDICINE

## 2022-03-10 PROCEDURE — 36415 COLL VENOUS BLD VENIPUNCTURE: CPT

## 2022-03-10 PROCEDURE — 85027 COMPLETE CBC AUTOMATED: CPT

## 2022-03-10 PROCEDURE — 80048 BASIC METABOLIC PNL TOTAL CA: CPT

## 2022-03-10 RX ORDER — NITROGLYCERIN 0.4 MG/1
0.4 TABLET SUBLINGUAL
Qty: 24 TABLET | Refills: 1 | Status: SHIPPED | OUTPATIENT
Start: 2022-03-10

## 2022-03-10 RX ORDER — LISINOPRIL 40 MG/1
40 TABLET ORAL DAILY
Qty: 90 TABLET | Refills: 3 | Status: SHIPPED | OUTPATIENT
Start: 2022-03-10

## 2022-03-10 RX ORDER — ATORVASTATIN CALCIUM 80 MG/1
80 TABLET, FILM COATED ORAL EVERY EVENING
Qty: 180 TABLET | Refills: 3 | Status: SHIPPED | OUTPATIENT
Start: 2022-03-10 | End: 2024-02-28

## 2022-03-10 NOTE — LETTER
March 10, 2022     ISRAEL Sharif  [address] [facility] HEART [place], [facility]  [address]  [address]    Patient: Nelida West   YOB: 1960   Date of Visit: 3/10/2022       Dear Dr Rj Sotomayor: Thank you for referring Arelis Delaney to me for evaluation  Below are my notes for this consultation  If you have questions, please do not hesitate to call me  I look forward to following your patient along with you  Sincerely,        ISRAEL Spivey        CC: MD Juan J Matthew CRNP  3/10/2022 12:17 PM  Sign when Signing Visit  Cardiology  MI Follow Up   Office Visit Note  Nelida West   64 y o    male   MRN: [id number]  [address]  [address] [phone]    PCP: ISRAEL Sharif  Cardiologist: will be Dr Yue Boo            Summary of recommendations  Heart healthy diet  Educational information provided  Lower extremity arterial duplex due to bilateral leg and back pain  Cardiac rehab has been prescribed recommended  Nonfasting CBC, BMP   Lipid profile 8 weeks  Medical adherence to dual antiplatelet therapy reinforced  Periodic home blood pressure monitoring  Goal /80 or less  Return to work note provided, after an additional week  He works in a group home  Follow up will be scheduled with Dr Yue Boo 6 weeks  Sublingual nitroglycerin with instructions for use  CR screening:  Up-to-date -colonoscopy 4/20/21          Assessment/plan  Inferior STEMI, recent adm 3/2-3/4/22  CAD; S/P PCI/ITZEL x1 to the 80% proximal RCA  POBA to the 90% mid RCA; stent attempted, unsuccessful due to tortuous anatomy  30% residual mid RCA  RCA was very large, aneurysmal, tortuous, calcified   Otherwise, 10% LM, and mild diffuse CAD of the LAD, circumflex and moderate disease of the RCA, medically managed   On aspirin 81 mg daily, Plavix 75 mg daily, a high-intensity statin, beta-blocker  · Dual antiplatelet therapy beyond 1 year due to the angioplastied aneurysmal mid RCA   Cardiac rehab has been prescribed and recommended   Adherence to dual antiplatelet therapy reinforced  EF 60-65%  inferolateral hypokinesis with possible basal inferior hypokinesis  Hypertension, essential   /86 lisinopril 40 mg daily, metoprolol tartrate 50 mg q 12  Previously on amlodipine 10 which was discontinued in favor of metoprolol  He was also taking lisinopril, a 40 mg plus a 10 mg pill  The 10 mg pill was held  Hyperlipidemia, on atorvastatin 80 mg daily  3/2/22: LDL 95   Non      Goal LDL less than 70  Heart healthy diet  Reassess 8 weeks   Prior to admission was on atorvastatin 40 mg daily  Former tobacco use , many years ago  Arthritis  Medical marijuana use for his arthritis  He reports he also uses white willow bark  I asked him to hold off on this  Cardiac testing   TTE 3/3/22 EF 60-65%  inferolateral hypokinesis with possible basal inferior hypokinesis  Otherwise normal wall motion  Diastolic function is normal    Cardiac catheterization 3/2/22  ·  Prox RCA lesion is 80% stenosed  Culprit lesion  Mid RCA lesion is 90% stenosed  Culprit lesion  Culprit for clinical presentation  · S/p PCI/ITZEL x 2 RCA  Stenting of the mid RCA was attempted but the stent would not pass  · Mid LM:  Focal, 10% stenosis  Mild diffuse disease throughout the LAD, left circumflex  Moderate disease throughout the RCA          HPI  Alta Jamison is a 65 yo male with essential hypertension, dyslipidemia  He is a former tobacco user in remission  Prior history CAD heart failure nor arrhythmia  Adm 3/2-3/4/22  CC:  Severe chest pain, radiation down his right arm associated with dyspnea and mild nausea  EKG showed inferior ST elevation  MI alert was called  He underwent urgent angiography  This demonstrated a culprit 90% mid RCA lesion as well as an 80% proximal RCA lesion  He underwent PCI/ITZEL to the proximal RCA  Stenting of the mid RCA was attempted but unsuccessful due to tortuous anatomy  He underwent POBA, with residual 30% stenosis of this area  Otherwise there was a 10% stenosis of the mid LM, mild diffuse CAD of the LAD, and circumflex  Moderate disease of the RCA  He was placed on dual antiplatelet therapy with aspirin and Plavix  His statin therapy was intensified, atorvastatin was increased to 80 mg daily for baseline LDL of 95 non   Is also placed on metoprolol tartrate 50 mg q 12 in addition to lisinopril 40 mg daily  Outpatient amlodipine was discontinued  His echocardiogram showed preserved LV function with inferolateral hypokinesis with possible basal inferior hypokinesis otherwise normal wall motion and no significant valve disease  Additionally, he had low pulses in his lower extremities  He has had chronic back pain, and lower extremity pain which inhibits his exercise  CANDICE recommended as an outpatient    3/10/22  Follow-up   He is accompanied by his wife  MIKE: he feels good ;no chest pain, shortness of breath, lightheadedness or dizziness  He does have persistent chronic back and leg pain  He has a fair amount of arthritis  Compliant with his medications  /86  Continue to monitor her blood pressures, log and bring to the visit  Together we reviewed his cardiac angiography diagram  Reviewed his echocardiogram   He is aware of the findings  Reviewed his lipids  Recommended a heart healthy diet  In the past he was eating foods such as cheese steaks and [employer] fries   He is amenable to labs today  Will also get lower extremity CANDICE  Follow-up lipids 8 weeks  Meds were refilled  He was provided a prescription for sublingual nitroglycerin with instructions for use  Follow-up with his cardiologist in 6 weeks  Work note was provided today, to be out for an additional week before returning            I have spent 40 minutes with Patient and family today in which [facility] than 50% of this time was spent in counseling/coordination of care regarding Intructions for management, Patient and family education, Importance of tx compliance and Risk factor reductions  Assessment  Diagnoses and all orders for this visit:    Hospital discharge follow-up    ST elevation myocardial infarction involving right coronary artery ([address] )    Coronary artery disease involving native coronary artery of native heart without angina pectoris    Status post insertion of drug eluting coronary artery stent    Pure hypercholesterolemia    Primary hypertension          Past Medical History:   Diagnosis Date    Arthritis     Hypertension        Review of Systems   Constitutional: Negative for chills  Cardiovascular: Negative for chest pain, claudication, cyanosis, dyspnea on exertion, irregular heartbeat, leg swelling, near-syncope, orthopnea, palpitations, paroxysmal nocturnal dyspnea and syncope  Respiratory: Negative for cough and shortness of breath  Musculoskeletal: Positive for back pain  Chronic back and leg pain   Gastrointestinal: Negative for heartburn and nausea  Neurological: Negative for dizziness, focal weakness, headaches, light-headedness and weakness  All other systems reviewed and are negative  No Known Allergies        Current Outpatient Medications:     aspirin 81 mg chewable tablet, Chew 1 tablet (81 mg total) daily, Disp: 60 tablet, Rfl: 0    atorvastatin (LIPITOR) 80 mg tablet, Take 1 tablet (80 mg total) by mouth every evening, Disp: 60 tablet, Rfl: 0    clopidogrel (PLAVIX) 75 mg tablet, Take 1 tablet (75 mg total) by mouth daily, Disp: 30 tablet, Rfl: 11    lisinopril (ZESTRIL) 40 mg tablet, Take 40 mg by mouth daily, Disp: , Rfl:     metoprolol tartrate (LOPRESSOR) 50 mg tablet, Take 1 tablet (50 mg total) by mouth every 12 (twelve) hours, Disp: 60 tablet, Rfl: 0        Social History     Socioeconomic History    Marital status: /Civil Union     Spouse name: Not on file    Number of children: Not on file    Years of education: Not on file    Highest education level: Not on file   Occupational History    Not on file   Tobacco Use    Smoking status: Former Smoker     Types: Cigarettes    Smokeless tobacco: Never Used   Substance and Sexual Activity    Alcohol use: Yes     Comment: occassionally     Drug use: Yes     Types: Marijuana    Sexual activity: Not on file   Other Topics Concern    Not on file   Social History Narrative    Not on file     Social Determinants of Health     Financial Resource Strain: Not on file   Food Insecurity: Not on file   Transportation Needs: Not on file   Physical Activity: Not on file   Stress: Not on file   Social Connections: Not on file   Intimate Partner Violence: Not on file   Housing Stability: Not on file       Family History   Problem Relation Age of Onset    Heart disease Father        Physical Exam  Vitals and nursing note reviewed  Constitutional:       General: He is not in acute distress  HENT:      Head: Normocephalic and atraumatic  Eyes:      Conjunctiva/sclera: Conjunctivae normal    Cardiovascular:      Rate and Rhythm: Normal rate and regular rhythm  Pulses: Intact distal pulses  Heart sounds: Normal heart sounds  Pulmonary:      Effort: Pulmonary effort is normal       Breath sounds: Normal breath sounds  Abdominal:      General: Bowel sounds are normal       Palpations: Abdomen is soft  Musculoskeletal:         General: Normal range of motion  Cervical back: Normal range of motion and neck supple  Skin:     General: Skin is warm and dry  Neurological:      Mental Status: He is alert and oriented to person, place, and time  Vitals: There were no vitals taken for this visit     Wt Readings from Last 3 Encounters:   03/04/22 99 8 kg (220 lb 0 3 oz)         Labs & Results:  Lab Results   Component Value Date    WBC 12 41 (H) 03/03/2022    HGB 15 3 03/03/2022    HCT 42 1 03/03/2022    MCV 85 03/03/2022     03/03/2022     No results found for: BNP  No components found for: CHEM  No results found for: CKTOTAL, TROPONINI, TROPONINT, CKMBINDEX  No results found for this or any previous visit  No results found for this or any previous visit  This note was completed in part utilizing m-modal fluency direct voice recognition software  Grammatical errors, random word insertion, spelling mistakes, and incomplete sentences may be an occasional consequence of the system secondary to software limitations, ambient noise and hardware issues  At the time of dictation, efforts were made to edit, clarify and /or correct errors  Please read the chart carefully and recognize, using context, where substitutions have occurred    If you have any questions or concerns about the context, text or information contained within the body of this dictation, please contact myself, the provider, for further clarification

## 2022-03-10 NOTE — LETTER
March 10, 2022     Patient: Marce Watkins   YOB: 1960   Date of Visit: 3/10/2022       To Whom it May Concern:    Emily Nguyen is under my professional care  He was seen in my office on 3/10/2022  He may return to work March 22nd  If you have any questions or concerns, please don't hesitate to call           Sincerely,          ISRAEL Gamboa        CC: No Recipients

## 2022-03-10 NOTE — PATIENT INSTRUCTIONS
Mediterranean Diet   AMBULATORY CARE:   A Mediterranean diet  is a meal plan that includes foods that are commonly eaten in countries that border the [place]  This meal plan may provide several health benefits  These include losing or maintaining weight, and decreasing blood pressure, blood sugar, and cholesterol levels  It may also help protect against certain health conditions such as heart disease, cancer, type 2 diabetes, and Alzheimer disease  Work with a dietitian to develop a meal plan that is right for you  Foods to include in the [address] [facility] diet:   · Include fruits and vegetables in each meal   Eat a variety of fresh fruits and vegetables  · Choose whole grains every day  These foods include whole-grain breads, pastas, and cereals  It also includes brown rice, quinoa, and millet  · Use unsaturated fats instead of saturated fats  Cook with olive or canola oil  Limit saturated fats, such as butter, margarine, and shortening  Saturated fat is an unhealthy fat that can increase your cholesterol levels  · Choose plant foods, poultry, and fish as your main sources of protein  ? Eat plant-based foods that provide protein,  such as lentils, beans, chickpeas, nuts, and seeds  Choose mostly plant-based foods in place of meat on most days of the week  ? Eat protein foods high in omega-3 fats  Fish high in omega-3 fats include salmon, trout, and tuna  Include these types of fish 1 or 2 times each week  Limit fish high in mercury, such as shark, swordfish, tilefish, and carolyn mackerel  Omega-3 fats are also found in walnuts and flaxseed  ? Choose poultry (chicken or turkey)  without skin instead of red meat  Red meat is high in saturated fat  Limit eggs and high-fat meats, such as neves, sausage, and hot dogs  · Choose low-fat dairy foods  such as nonfat or 1% milk, or low-fat almond, cashew, or soy milk   Other examples include low-fat cheese, yogurt, and cottage cheese  · Limit sweets  Limit your intake of high-sugar foods, such as soda, desserts, and candy  · Talk to your healthcare provider about alcohol  Studies have shown that moderate intake of wine may reduce the risk of heart disease  A moderate amount of wine is 1 serving for women and men 65 years and older each day  Two servings is recommended for men 24to 59years of age each day  A serving of wine is 5 ounces  Other things you need to know if you follow the Mediterranean diet:   · Include foods high in iron and vitamin C   Plant-based foods that are high in iron include spinach, beans, tofu, and artichoke  Eat a serving of vitamin C with any iron-rich food to help your body absorb more iron  Examples include oranges, strawberries, cantaloupe, broccoli, and yellow peppers  · Get regular physical activity  The Mediterranean diet will have the most benefit if you get regular physical activity  Get 30 minutes of physical activity at least 5 days a week  Choose physical activities that increase your heart rate  Examples include walking, hiking, swimming, and riding a bike  Ask your healthcare provider about the best exercise plan for you  © Copyright [employer] 2022 Information is for End User's use only and may not be sold, redistributed or otherwise used for commercial purposes  All illustrations and images included in CareNotes® are the copyrighted property of [employer]  or Gasper [employer]  The above information is an  only  It is not intended as medical advice for individual conditions or treatments  Talk to your doctor, nurse or pharmacist before following any medical regimen to see if it is safe and effective for you

## 2022-03-21 ENCOUNTER — HOSPITAL ENCOUNTER (OUTPATIENT)
Dept: NON INVASIVE DIAGNOSTICS | Facility: CLINIC | Age: 62
Discharge: HOME | End: 2022-03-21
Payer: COMMERCIAL

## 2022-03-21 DIAGNOSIS — I10 PRIMARY HYPERTENSION: ICD-10-CM

## 2022-03-21 DIAGNOSIS — E78.00 PURE HYPERCHOLESTEROLEMIA: ICD-10-CM

## 2022-03-21 DIAGNOSIS — I25.10 CORONARY ARTERY DISEASE INVOLVING NATIVE CORONARY ARTERY OF NATIVE HEART WITHOUT ANGINA PECTORIS: ICD-10-CM

## 2022-03-21 DIAGNOSIS — M79.604 LEG PAIN, BILATERAL: ICD-10-CM

## 2022-03-21 DIAGNOSIS — M79.605 LEG PAIN, BILATERAL: ICD-10-CM

## 2022-03-21 PROCEDURE — 93923 UPR/LXTR ART STDY 3+ LVLS: CPT

## 2022-03-22 PROCEDURE — 93923 UPR/LXTR ART STDY 3+ LVLS: CPT | Performed by: SURGERY

## 2022-03-28 DIAGNOSIS — I21.19 ACUTE MI, INFERIOR WALL (HCC): ICD-10-CM

## 2022-03-28 RX ORDER — METOPROLOL TARTRATE 50 MG/1
50 TABLET, FILM COATED ORAL EVERY 12 HOURS SCHEDULED
Qty: 180 TABLET | Refills: 4 | Status: SHIPPED | OUTPATIENT
Start: 2022-03-28 | End: 2022-07-07 | Stop reason: SDUPTHER

## 2022-04-08 ENCOUNTER — TELEPHONE (OUTPATIENT)
Dept: CARDIAC REHAB | Facility: CLINIC | Age: 62
End: 2022-04-08

## 2022-06-02 ENCOUNTER — OFFICE VISIT (OUTPATIENT)
Dept: CARDIOLOGY CLINIC | Facility: CLINIC | Age: 62
End: 2022-06-02

## 2022-06-02 VITALS
OXYGEN SATURATION: 99 % | BODY MASS INDEX: 33.56 KG/M2 | HEART RATE: 51 BPM | DIASTOLIC BLOOD PRESSURE: 106 MMHG | HEIGHT: 66 IN | SYSTOLIC BLOOD PRESSURE: 180 MMHG | WEIGHT: 208.8 LBS

## 2022-06-02 DIAGNOSIS — Z95.5 STATUS POST INSERTION OF DRUG ELUTING CORONARY ARTERY STENT: ICD-10-CM

## 2022-06-02 DIAGNOSIS — I10 PRIMARY HYPERTENSION: ICD-10-CM

## 2022-06-02 DIAGNOSIS — I25.10 CORONARY ARTERY DISEASE INVOLVING NATIVE CORONARY ARTERY OF NATIVE HEART WITHOUT ANGINA PECTORIS: ICD-10-CM

## 2022-06-02 DIAGNOSIS — E78.00 PURE HYPERCHOLESTEROLEMIA: ICD-10-CM

## 2022-06-02 DIAGNOSIS — I21.11 ST ELEVATION MYOCARDIAL INFARCTION INVOLVING RIGHT CORONARY ARTERY (HCC): Primary | ICD-10-CM

## 2022-06-02 PROCEDURE — 99214 OFFICE O/P EST MOD 30 MIN: CPT | Performed by: INTERNAL MEDICINE

## 2022-06-02 RX ORDER — AMLODIPINE BESYLATE 5 MG/1
5 TABLET ORAL DAILY
Qty: 30 TABLET | Refills: 6 | Status: SHIPPED | OUTPATIENT
Start: 2022-06-02

## 2022-06-02 NOTE — PROGRESS NOTES
[facility]  Follow up note  Henrene Osler 64 y o  male MRN: [id number]        Problems    1  ST elevation myocardial infarction involving right coronary artery ([address] )     2  Pure hypercholesterolemia     3  Coronary artery disease involving native coronary artery of native heart without angina pectoris     4  Primary hypertension  amLODIPine (NORVASC) 5 mg tablet   5   Status post insertion of drug eluting coronary artery stent         Impression:    Peripheral vascular disease  Somewhat atypical lower extremity claudication type symptoms, decreased pulses on exam, poorly compressible metatarsal vessels likely due to calcification, but no obstructive disease on ABIs  Coronary artery disease  Inferior STEMI 3/22, with drug-eluting stent to the proximal RCA and balloon angioplasty to the mid RCA, with inability to pass a stent to the mid vessel  Echo showed normal LVEF with basal inferolateral hypokinesis  Continues to tolerate dual antiplatelet therapy  Hypertension  Severely elevated today  Hyperlipidemia  LDL 95, HDL 45, triglycerides normal at the time of his acute MI  On appropriate statin therapy  History of tobacco abuse    Plan:    He has no significant claudication symptoms  No significant cardiac symptoms at this time  Cholesterol needs to be reassessed  Blood pressure needs to be optimized  We will add amlodipine 5 mg daily, I did warn him of possible lightheadedness if this happens, cut the pill in half  Keep taking all other medications most importantly the aspirin and the Clopidogrel for a minimum of 1 year although we will probably go further considering the intervention on his right coronary artery  He cannot interrupt aspirin or Clopidogrel for at least 1 year if any procedures are needed  In the future we will consider switching lisinopril and amlodipine to Lotrel, but he is dealing with some odd insurance situation currently and will wait till that gets rectified      HPI:   Yessenia Biggs Tsering Schuler is a 64y o  year old male with longstanding hypertension, mild hypercholesterolemia, presented with inferior STEMI 3/22, with drug-eluting stent to the proximal RCA, angioplasty only to the mid RCA due to difficulty passing the stent, and minimal disease elsewhere, with preserved left ventricular function on echo except for evidence of basal inferior/inferolateral hypokinesis presents for a follow-up visit  His blood pressure is not controlled  He has not had any recurrent angina  He has not taken any nitroglycerin  He is compliant with aspirin and Plavix  He is compliant with his antihypertensive medical therapy   He is tolerating atorvastatin   Follow-up lipids assessed early after his hospitalization by his PCP reveal LDL of 67 on 80 milligrams of atorvastatin  Review of Systems   Constitutional: Negative for appetite change, diaphoresis, fatigue and fever  Respiratory: Negative for chest tightness, shortness of breath and wheezing  Cardiovascular: Negative for chest pain, palpitations and leg swelling  Gastrointestinal: Negative for abdominal pain and blood in stool  Musculoskeletal: Negative for arthralgias and joint swelling  Skin: Negative for rash  Neurological: Negative for dizziness, syncope and light-headedness         Past Medical History:   Diagnosis Date    Arthritis     Hypertension      Social History     Substance and Sexual Activity   Alcohol Use Yes    Comment: occassionally      Social History     Substance and Sexual Activity   Drug Use Yes    Types: Marijuana     Social History     Tobacco Use   Smoking Status Former Smoker    Types: Cigarettes   Smokeless Tobacco Never Used       Allergies:  No Known Allergies    Medications:     Current Outpatient Medications:     amLODIPine (NORVASC) 5 mg tablet, Take 1 tablet (5 mg total) by mouth daily, Disp: 30 tablet, Rfl: 6    aspirin 81 mg chewable tablet, Chew 1 tablet (81 mg total) daily, Disp: 60 tablet, Rfl: 0    atorvastatin (LIPITOR) 80 mg tablet, Take 1 tablet (80 mg total) by mouth every evening, Disp: 180 tablet, Rfl: 3    clopidogrel (PLAVIX) 75 mg tablet, Take 1 tablet (75 mg total) by mouth daily, Disp: 30 tablet, Rfl: 11    lisinopril (ZESTRIL) 40 mg tablet, Take 1 tablet (40 mg total) by mouth daily, Disp: 90 tablet, Rfl: 3    metoprolol tartrate (LOPRESSOR) 50 mg tablet, Take 1 tablet (50 mg total) by mouth every 12 (twelve) hours, Disp: 180 tablet, Rfl: 4    nitroglycerin (NITROSTAT) 0 4 mg SL tablet, Place 1 tablet (0 4 mg total) under the tongue every 5 (five) minutes as needed for chest pain, Disp: 24 tablet, Rfl: 1    NON FORMULARY, Medical marijuana, Disp: , Rfl:       Vitals:    06/02/22 1015   BP: (!) 180/106   Pulse: (!) 51   SpO2: 99%     Weight (last 2 days)     Date/Time Weight    06/02/22 1015 94 7 (208 8)        Physical Exam  Constitutional:       General: He is not in acute distress  Appearance: He is not diaphoretic  HENT:      Head: Normocephalic and atraumatic  Eyes:      General: No scleral icterus  Conjunctiva/sclera: Conjunctivae normal    Neck:      Vascular: No JVD  Cardiovascular:      Rate and Rhythm: Normal rate and regular rhythm  Heart sounds: Normal heart sounds  No murmur heard  Pulmonary:      Effort: Pulmonary effort is normal  No respiratory distress  Breath sounds: Normal breath sounds  No decreased breath sounds, wheezing, rhonchi or rales  Musculoskeletal:      Cervical back: Normal range of motion  Right lower leg: Normal  No edema  Left lower leg: Normal  No edema  Skin:     General: Skin is warm and dry  Neurological:      Mental Status: He is alert and oriented to person, place, and time  Laboratory Studies:    Laboratory studies personally reviewed    Cardiac testing:     EKG reviewed personally:   No results found for this visit on 06/02/22        Echocardiogram:  3/22-EF normal, basal inferior hypokinesis Stress tests:      Catheterization:  3/22-80% proximal RCA status post ITZEL, 90% mid RCA status post angioplasty, could not pass a stent, luminal irregularities elsewhere    Holter:         Manav May MD    Portions of the record may have been created with voice recognition software  Occasional wrong word or "sound a like" substitutions may have occurred due to the inherent limitations of voice recognition software  Read the chart carefully and recognize, using context, where substitutions have occurred

## 2022-06-02 NOTE — PATIENT INSTRUCTIONS
He has no significant claudication symptoms  No significant cardiac symptoms at this time  Cholesterol needs to be reassessed  Blood pressure needs to be optimized  We will add amlodipine 5 mg daily, I did warn him of possible lightheadedness if this happens, cut the pill in half  Keep taking all other medications most importantly the aspirin and the Clopidogrel for a minimum of 1 year although we will probably go further considering the intervention on his right coronary artery  He cannot interrupt aspirin or Clopidogrel for at least 1 year if any procedures are needed  In the future we will consider switching lisinopril and amlodipine to Lotrel, but he is dealing with some odd insurance situation currently and will wait till that gets rectified

## 2022-07-07 DIAGNOSIS — I21.19 ACUTE MI, INFERIOR WALL (HCC): ICD-10-CM

## 2022-07-07 RX ORDER — METOPROLOL TARTRATE 50 MG/1
50 TABLET, FILM COATED ORAL EVERY 12 HOURS SCHEDULED
Qty: 180 TABLET | Refills: 3 | Status: SHIPPED | OUTPATIENT
Start: 2022-07-07 | End: 2022-09-26

## 2022-08-18 ENCOUNTER — OFFICE VISIT (OUTPATIENT)
Dept: CARDIOLOGY CLINIC | Facility: CLINIC | Age: 62
End: 2022-08-18
Payer: COMMERCIAL

## 2022-08-18 VITALS
HEART RATE: 52 BPM | SYSTOLIC BLOOD PRESSURE: 188 MMHG | WEIGHT: 212.9 LBS | DIASTOLIC BLOOD PRESSURE: 108 MMHG | HEIGHT: 66 IN | BODY MASS INDEX: 34.22 KG/M2

## 2022-08-18 DIAGNOSIS — Z95.5 STATUS POST INSERTION OF DRUG ELUTING CORONARY ARTERY STENT: ICD-10-CM

## 2022-08-18 DIAGNOSIS — I25.10 CORONARY ARTERY DISEASE INVOLVING NATIVE CORONARY ARTERY OF NATIVE HEART WITHOUT ANGINA PECTORIS: ICD-10-CM

## 2022-08-18 DIAGNOSIS — I10 PRIMARY HYPERTENSION: ICD-10-CM

## 2022-08-18 DIAGNOSIS — E78.00 PURE HYPERCHOLESTEROLEMIA: ICD-10-CM

## 2022-08-18 DIAGNOSIS — I21.11 ST ELEVATION MYOCARDIAL INFARCTION INVOLVING RIGHT CORONARY ARTERY (HCC): Primary | ICD-10-CM

## 2022-08-18 PROCEDURE — 99214 OFFICE O/P EST MOD 30 MIN: CPT | Performed by: INTERNAL MEDICINE

## 2022-08-18 RX ORDER — HYDROCHLOROTHIAZIDE 25 MG/1
25 TABLET ORAL DAILY
Qty: 90 TABLET | Refills: 3 | Status: SHIPPED | OUTPATIENT
Start: 2022-08-18

## 2022-08-18 NOTE — PATIENT INSTRUCTIONS
please avoid caffeine for now, decaf coffee is okay   We are adding hydrochlorothiazide, 25 mg daily   Taken in conjunction with all your other pills  It removed sodium from the body through the kidney channels   Please check your blood work in 1 week   I will have a nurse practitioner follow-up scheduled in 2 weeks and then see me in the next 6-8 weeks to follow-up after that      There is a alternative to lisinopril, called losartan, once we have establish stability of your meds we can switch due to losartan/ HCTZ which would come in a combination pill

## 2022-08-18 NOTE — PROGRESS NOTES
[facility]  Follow up note  Hannah Reeder 64 y o  male MRN: [id number]        Problems    1  ST elevation myocardial infarction involving right coronary artery (HCC)  hydrochlorothiazide (HYDRODIURIL) 25 mg tablet    Basic metabolic panel   2  Pure hypercholesterolemia  Lipid panel   3  Coronary artery disease involving native coronary artery of native heart without angina pectoris     4  Primary hypertension     5   Status post insertion of drug eluting coronary artery stent         Impression:    Peripheral vascular disease   calcified lower extremity vessels without claudication symptoms at this time  Coronary artery disease  Inferior STEMI 3/22, with drug-eluting stent to the proximal RCA and balloon angioplasty to the mid RCA, with inability to pass a stent to the mid vessel  Echo showed normal LVEF with basal inferolateral hypokinesis   he continues on dual antiplatelet therapy with good tolerance  Hypertension  Remains severely elevated despite addition of amlodipine   Hyperlipidemia  LDL 95, HDL 45, triglycerides normal at the time of his acute MI   continues on appropriate statin  therapy  History of tobacco abuse    Plan:     blood pressure still considerably elevated despite the addition of amlodipine   He otherwise tolerates lisinopril well, metoprolol well  He has very mild bradycardia   We will add HCTZ   Check BMP in a week  2 week nurse practitioner visit   Check lipids as well  Follow-up with me in approximately 6-8 weeks   considering his blood pressure has not changed at all with the addition of amlodipine, if it does improve significantly with thiazide diuretic, would consider discontinuation of amlodipine      HPI:   Hannah Reeder is a 64y o  year old male with longstanding hypertension, mild hypercholesterolemia, presented with inferior STEMI 3/22, with drug-eluting stent to the proximal RCA, angioplasty only to the mid RCA due to difficulty passing the stent, and minimal disease elsewhere, with preserved left ventricular function on echo except for evidence of basal inferior/inferolateral hypokinesis presents for a follow-up visit  lipids have not been reassessed   He feels well  He has no complaints   He is compliant with medical therapy   He did not have coffee today   His blood pressure remains severely elevated   He denies headache or chest pain   He continues on dual antiplatelet therapy with excellent tolerance      Review of Systems   Constitutional: Negative for appetite change, diaphoresis, fatigue and fever  Respiratory: Negative for chest tightness, shortness of breath and wheezing  Cardiovascular: Negative for chest pain, palpitations and leg swelling  Gastrointestinal: Negative for abdominal pain and blood in stool  Musculoskeletal: Negative for arthralgias and joint swelling  Skin: Negative for rash  Neurological: Negative for dizziness, syncope and light-headedness         Past Medical History:   Diagnosis Date    Arthritis     Hypertension      Social History     Substance and Sexual Activity   Alcohol Use Yes    Comment: occassionally      Social History     Substance and Sexual Activity   Drug Use Yes    Types: Marijuana     Social History     Tobacco Use   Smoking Status Former Smoker    Types: Cigarettes   Smokeless Tobacco Never Used       Allergies:  No Known Allergies    Medications:     Current Outpatient Medications:     amLODIPine (NORVASC) 5 mg tablet, Take 1 tablet (5 mg total) by mouth daily, Disp: 30 tablet, Rfl: 6    aspirin 81 mg chewable tablet, Chew 1 tablet (81 mg total) daily, Disp: 60 tablet, Rfl: 0    atorvastatin (LIPITOR) 80 mg tablet, Take 1 tablet (80 mg total) by mouth every evening, Disp: 180 tablet, Rfl: 3    clopidogrel (PLAVIX) 75 mg tablet, Take 1 tablet (75 mg total) by mouth daily, Disp: 30 tablet, Rfl: 11    hydrochlorothiazide (HYDRODIURIL) 25 mg tablet, Take 1 tablet (25 mg total) by mouth daily, Disp: 90 tablet, Rfl: 3    lisinopril (ZESTRIL) 40 mg tablet, Take 1 tablet (40 mg total) by mouth daily, Disp: 90 tablet, Rfl: 3    metoprolol tartrate (LOPRESSOR) 50 mg tablet, Take 1 tablet (50 mg total) by mouth every 12 (twelve) hours, Disp: 180 tablet, Rfl: 3    NON FORMULARY, Medical marijuana, Disp: , Rfl:     nitroglycerin (NITROSTAT) 0 4 mg SL tablet, Place 1 tablet (0 4 mg total) under the tongue every 5 (five) minutes as needed for chest pain (Patient not taking: Reported on 8/18/2022), Disp: 24 tablet, Rfl: 1      Vitals:    08/18/22 0922   BP: (!) 188/108   Pulse: (!) 52     Weight (last 2 days)     Date/Time Weight    08/18/22 0922 96 6 (212 9)        Physical Exam  Constitutional:       General: He is not in acute distress  Appearance: He is not diaphoretic  HENT:      Head: Normocephalic and atraumatic  Eyes:      General: No scleral icterus  Conjunctiva/sclera: Conjunctivae normal    Neck:      Vascular: No JVD  Cardiovascular:      Rate and Rhythm: Normal rate and regular rhythm  Heart sounds: Normal heart sounds  No murmur heard  Pulmonary:      Effort: Pulmonary effort is normal  No respiratory distress  Breath sounds: Normal breath sounds  No decreased breath sounds, wheezing, rhonchi or rales  Musculoskeletal:      Cervical back: Normal range of motion  Right lower leg: Normal  No edema  Left lower leg: Normal  No edema  Skin:     General: Skin is warm and dry  Neurological:      Mental Status: He is alert and oriented to person, place, and time  Laboratory Studies:    Laboratory studies personally reviewed    Cardiac testing:     EKG reviewed personally:   No results found for this visit on 08/18/22        Echocardiogram:  3/22-EF normal, basal inferior hypokinesis     Stress tests:      Catheterization:  3/22-80% proximal RCA status post ITZEL, 90% mid RCA status post angioplasty, could not pass a stent, luminal irregularities elsewhere    Holter: Katja Dutton MD    Portions of the record may have been created with voice recognition software  Occasional wrong word or "sound a like" substitutions may have occurred due to the inherent limitations of voice recognition software  Read the chart carefully and recognize, using context, where substitutions have occurred

## 2022-09-22 NOTE — PROGRESS NOTES
General Cardiology Outpatient Progress Note    Kota Barba 64 y o  male   MRN: [id number]  Encounter: [id number]    Assessment:  Patient Active Problem List    Diagnosis Date Noted   [facility] discharge follow-up 03/10/2022    Coronary artery disease involving native coronary artery of native heart without angina pectoris 03/10/2022    Status post insertion of drug eluting coronary artery stent 03/10/2022    ST elevation myocardial infarction involving right coronary artery ([address] ) 03/02/2022    Hypertension 03/02/2022    HLD (hyperlipidemia) 03/02/2022    Leukocytosis 03/02/2022    Hyperglycemia 03/02/2022    Hypokalemia 03/02/2022       Today's Plan:   Continue current medications   Advised patient to begin checking BP at home/work at least once weekly and to contact office with consistently high readings  Plan:  Coronary artery disease   Without active chest pain  S/p inferior STEMI in 03/2022  Received ITZEL to proximal RCA and POBA to mid RCA  TTE 03/03/2022: LVEF 60-65%  LVIDd 5 4 cm  Normal RV  Trace TR  Continue on aspirin, Plavix, statin, and metoprolol tartrate 50 mg q12 hours  PRN SL nitro prescribed  Hypertension   BP of 152/90 mmHg in office today  Continues on amlodipine 5 mg daily, HCTZ 25 mg daily, lisinopril 40 mg daily, and BB as above  Hyperlipidemia   Most recent lipid panel from 03/02/2022:  Cholesterol 159; TG 96; HDL 45; calculated LDL 95  Continue on atorvastatin 80 mg daily  Peripheral vascular disease  History of tobacco abuse    HPI:   Kota Barba is a 68-year-old man with a PMH as above who presents to the office for follow-up  Follows with Dr Makenzie Steiner  08/18/2022 with Dr Makenzie Steiner: "He feels well  He has no complaints  He is compliant with medical therapy  He did not have coffee today  His blood pressure remains severely elevated  He denies headache or chest pain  He continues on dual antiplatelet therapy with excellent tolerance       2 week nurse practitioner visit   Check lipids as well  Follow-up with me in approximately 6-8 weeks   considering his blood pressure has not changed at all with the addition of amlodipine, if it does improve significantly with thiazide diuretic, would consider discontinuation of amlodipine "    09/26/2022:  Patient presents for follow-up  No current cardiac complaints  Took morning medications this morning  Denies any recent episodes of chest pain or headaches  Has not required SL nitro use in months to years  Admits that he does not check his blood pressure at home or at work  Past Medical History:   Diagnosis Date    Arthritis     Coronary artery disease     History of tobacco abuse     Hyperlipidemia     Hypertension     STEMI involving right coronary artery ([facility] [address] 03/2022    S/p ITZEL to prox RCA and POBA to mid RCA       Review of Systems   Constitutional: Negative for activity change, appetite change, fatigue, fever and unexpected weight change  HENT: Negative for congestion, postnasal drip, rhinorrhea, sneezing, sore throat and trouble swallowing  Eyes: Negative  Respiratory: Negative for cough, chest tightness and shortness of breath  Cardiovascular: Negative for chest pain, palpitations and leg swelling  Gastrointestinal: Negative for abdominal distention, abdominal pain, diarrhea, nausea and vomiting  Endocrine: Negative  Genitourinary: Negative for decreased urine volume, difficulty urinating, dysuria and urgency  Musculoskeletal: Negative  Skin: Negative  Allergic/Immunologic: Negative  Neurological: Negative for dizziness, tremors, syncope, weakness, light-headedness and headaches  Hematological: Negative  Psychiatric/Behavioral: Negative for agitation, confusion and sleep disturbance  The patient is not nervous/anxious  14-point ROS completed and negative except as stated above and/or in the HPI      No Known Allergies      Current Outpatient Medications:    amLODIPine (NORVASC) 5 mg tablet, Take 1 tablet (5 mg total) by mouth daily, Disp: 30 tablet, Rfl: 6    aspirin 81 mg chewable tablet, Chew 1 tablet (81 mg total) daily, Disp: 60 tablet, Rfl: 0    atorvastatin (LIPITOR) 80 mg tablet, Take 1 tablet (80 mg total) by mouth every evening, Disp: 180 tablet, Rfl: 3    clopidogrel (PLAVIX) 75 mg tablet, Take 1 tablet (75 mg total) by mouth daily, Disp: 30 tablet, Rfl: 11    hydrochlorothiazide (HYDRODIURIL) 25 mg tablet, Take 1 tablet (25 mg total) by mouth daily, Disp: 90 tablet, Rfl: 3    lisinopril (ZESTRIL) 40 mg tablet, Take 1 tablet (40 mg total) by mouth daily, Disp: 90 tablet, Rfl: 3    metoprolol tartrate (LOPRESSOR) 50 mg tablet, Take 1 tablet (50 mg total) by mouth every 12 (twelve) hours, Disp: 180 tablet, Rfl: 3    NON FORMULARY, Medical marijuana, Disp: , Rfl:     nitroglycerin (NITROSTAT) 0 4 mg SL tablet, Place 1 tablet (0 4 mg total) under the tongue every 5 (five) minutes as needed for chest pain (Patient not taking: Reported on 8/18/2022), Disp: 24 tablet, Rfl: 1    Social History     Socioeconomic History    Marital status: /Civil Union     Spouse name: Not on file    Number of children: Not on file    Years of education: Not on file    Highest education level: Not on file   Occupational History    Not on file   Tobacco Use    Smoking status: Former Smoker     Types: Cigarettes    Smokeless tobacco: Never Used   Substance and Sexual Activity    Alcohol use: Yes     Comment: occassionally     Drug use: Yes     Types: Marijuana    Sexual activity: Not on file   Other Topics Concern    Not on file   Social History Narrative    Not on file     Social Determinants of Health     Financial Resource Strain: Not on file   Food Insecurity: Not on file   Transportation Needs: Not on file   Physical Activity: Not on file   Stress: Not on file   Social Connections: Not on file   Intimate Partner Violence: Not on file   Housing Stability: Not on file     Family History   Problem Relation Age of Onset    Heart disease Father        Vitals:   Blood pressure 152/90, pulse 62, height 5' 6" (1 676 m), weight 96 9 kg (213 lb 11 2 oz), SpO2 98 %  Body mass index is 34 49 kg/m²  Wt Readings from Last 10 Encounters:   09/26/22 96 9 kg (213 lb 11 2 oz)   08/18/22 96 6 kg (212 lb 14 4 oz)   06/02/22 94 7 kg (208 lb 12 8 oz)   03/10/22 97 6 kg (215 lb 2 oz)   03/04/22 99 8 kg (220 lb 0 3 oz)     Vitals:    09/26/22 0840   BP: 152/90   BP Location: Right arm   Patient Position: Sitting   Cuff Size: Large   Pulse: 62   SpO2: 98%   Weight: 96 9 kg (213 lb 11 2 oz)   Height: 5' 6" (1 676 m)       Physical Exam  Vitals reviewed  Constitutional:       General: He is awake  He is not in acute distress  Appearance: Normal appearance  He is well-developed and overweight  He is not toxic-appearing or diaphoretic  HENT:      Head: Normocephalic  Nose: Nose normal       Mouth/Throat:      Mouth: Mucous membranes are moist    Eyes:      General: No scleral icterus  Conjunctiva/sclera: Conjunctivae normal    Neck:      Vascular: No JVD  Trachea: No tracheal deviation  Cardiovascular:      Rate and Rhythm: Normal rate and regular rhythm  No extrasystoles are present  Pulses: Normal pulses  Heart sounds: No murmur heard  Pulmonary:      Effort: Pulmonary effort is normal  No tachypnea, bradypnea or respiratory distress  Breath sounds: Normal air entry  No decreased air movement  No decreased breath sounds, wheezing or rhonchi  Abdominal:      General: Bowel sounds are normal  There is no distension  Palpations: Abdomen is soft  Tenderness: There is no abdominal tenderness  Musculoskeletal:      Cervical back: Neck supple  Right lower leg: No edema  Left lower leg: No edema  Skin:     General: Skin is warm and dry  Coloration: Skin is not jaundiced or pale     Neurological:      General: No focal deficit present  Mental Status: He is alert and oriented to person, place, and time  Psychiatric:         Attention and Perception: Attention normal          Mood and Affect: Mood and affect normal          Speech: Speech normal          Behavior: Behavior normal  Behavior is cooperative  Thought Content:  Thought content normal        Labs & Results:  Lab Results   Component Value Date    WBC 10 06 03/10/2022    HGB 16 2 03/10/2022    HCT 46 7 03/10/2022    MCV 86 03/10/2022     03/10/2022     Lab Results   Component Value Date    SODIUM 143 03/10/2022    K 4 7 03/10/2022     03/10/2022    CO2 26 03/10/2022    BUN 16 03/10/2022    CREATININE 1 06 03/10/2022    GLUC 104 03/10/2022    CALCIUM 9 0 03/10/2022     Lab Results   Component Value Date    INR 1 00 03/02/2022    PROTIME 13 2 03/02/2022     No results found for: NTBNP   No results found for: BNP     Moshe Cancino PA-C

## 2022-09-26 ENCOUNTER — OFFICE VISIT (OUTPATIENT)
Dept: CARDIOLOGY CLINIC | Facility: CLINIC | Age: 62
End: 2022-09-26
Payer: COMMERCIAL

## 2022-09-26 VITALS
BODY MASS INDEX: 34.34 KG/M2 | HEIGHT: 66 IN | DIASTOLIC BLOOD PRESSURE: 90 MMHG | HEART RATE: 62 BPM | OXYGEN SATURATION: 98 % | WEIGHT: 213.7 LBS | SYSTOLIC BLOOD PRESSURE: 152 MMHG

## 2022-09-26 DIAGNOSIS — I25.10 CORONARY ARTERY DISEASE INVOLVING NATIVE CORONARY ARTERY OF NATIVE HEART WITHOUT ANGINA PECTORIS: ICD-10-CM

## 2022-09-26 DIAGNOSIS — I10 PRIMARY HYPERTENSION: Primary | ICD-10-CM

## 2022-09-26 DIAGNOSIS — E78.5 HYPERLIPIDEMIA, UNSPECIFIED HYPERLIPIDEMIA TYPE: ICD-10-CM

## 2022-09-26 PROCEDURE — 99214 OFFICE O/P EST MOD 30 MIN: CPT | Performed by: PHYSICIAN ASSISTANT

## 2022-11-21 DIAGNOSIS — I10 PRIMARY HYPERTENSION: ICD-10-CM

## 2022-11-21 RX ORDER — AMLODIPINE BESYLATE 5 MG/1
5 TABLET ORAL DAILY
Qty: 90 TABLET | Refills: 3 | Status: SHIPPED | OUTPATIENT
Start: 2022-11-21

## 2023-01-25 ENCOUNTER — OFFICE VISIT (OUTPATIENT)
Dept: CARDIOLOGY CLINIC | Facility: CLINIC | Age: 63
End: 2023-01-25

## 2023-01-25 VITALS
WEIGHT: 211 LBS | HEART RATE: 59 BPM | OXYGEN SATURATION: 99 % | BODY MASS INDEX: 33.91 KG/M2 | DIASTOLIC BLOOD PRESSURE: 92 MMHG | SYSTOLIC BLOOD PRESSURE: 174 MMHG | HEIGHT: 66 IN

## 2023-01-25 DIAGNOSIS — E78.00 PURE HYPERCHOLESTEROLEMIA: ICD-10-CM

## 2023-01-25 DIAGNOSIS — I25.10 CORONARY ARTERY DISEASE INVOLVING NATIVE CORONARY ARTERY OF NATIVE HEART WITHOUT ANGINA PECTORIS: ICD-10-CM

## 2023-01-25 DIAGNOSIS — I21.11 ST ELEVATION MYOCARDIAL INFARCTION INVOLVING RIGHT CORONARY ARTERY (HCC): Primary | ICD-10-CM

## 2023-01-25 DIAGNOSIS — Z95.5 STATUS POST INSERTION OF DRUG ELUTING CORONARY ARTERY STENT: ICD-10-CM

## 2023-01-25 DIAGNOSIS — I10 PRIMARY HYPERTENSION: ICD-10-CM

## 2023-01-25 RX ORDER — SPIRONOLACTONE 50 MG/1
50 TABLET, FILM COATED ORAL DAILY
Qty: 90 TABLET | Refills: 3 | Status: SHIPPED | OUTPATIENT
Start: 2023-01-25

## 2023-01-25 NOTE — PROGRESS NOTES
[facility]  Follow up note  Chantel Sharma 58 y o  male MRN: [id number]        Problems    1  ST elevation myocardial infarction involving right coronary artery ([address] )        2  Pure hypercholesterolemia        3  Coronary artery disease involving native coronary artery of native heart without angina pectoris        4  Primary hypertension  spironolactone (ALDACTONE) 50 mg tablet    Basic metabolic panel      5  Status post insertion of drug eluting coronary artery stent            Impression:    Peripheral vascular disease  Calcified lower extremity vessels, but no claudication  Coronary artery disease  Inferior STEMI 3/22, with drug-eluting stent to the proximal RCA and balloon angioplasty to the mid RCA, with inability to pass a stent to the mid vessel  He continues on dual antiplatelet therapy  Will discontinue clopidogrel in April  Hypertension  Remains uncontrolled despite an extensive 4 drug regimen and in the setting of compliance  He has normal renal function  Hyperlipidemia  LDL 60, on high-dose atorvastatin  History of tobacco abuse    Plan:    Discontinue HCTZ  Start spironolactone 50 mg daily  Check BMP in 1 week  Prior potassium 8/22 on max dose lisinopril was only 3 8, I doubt he will have significant hyperkalemic issues with a combination of spironolactone and lisinopril  4-week follow-up  We will try to consolidate lisinopril/amlodipine into Lotrel eventually      HPI:   Chantel Sharma is a 58y o  year old male with longstanding hypertension, mild hypercholesterolemia, presented with inferior STEMI 3/22, with drug-eluting stent to the proximal RCA, angioplasty only to the mid RCA due to difficulty passing the stent, and minimal disease elsewhere, with preserved left ventricular function on echo except for evidence of basal inferior/inferolateral hypokinesis presents for a follow-up visit        He is compliant with medications  Blood pressure remains uncontrolled  Lipids are excellent, assessed 8/22, LDL 60  No cardiac symptoms  No claudication  Polypharmacy is a bit of an issue for him, he is having pill burden fatigue  We had previously discussed Lotrel as an option for combination alternative therapy for his amlodipine/lisinopril  Heart rates are 59 on 50 mg of metoprolol twice daily, he denies lightheadedness, dizziness        Review of Systems   Constitutional: Negative for appetite change, diaphoresis, fatigue and fever  Respiratory: Negative for chest tightness, shortness of breath and wheezing  Cardiovascular: Negative for chest pain, palpitations and leg swelling  Gastrointestinal: Negative for abdominal pain and blood in stool  Musculoskeletal: Negative for arthralgias and joint swelling  Skin: Negative for rash  Neurological: Negative for dizziness, syncope and light-headedness         Past Medical History:   Diagnosis Date   • Arthritis    • Coronary artery disease    • History of tobacco abuse    • Hyperlipidemia    • Hypertension    • STEMI involving right coronary artery ([facility] [address] ) 03/2022    S/p ITZEL to prox RCA and POBA to mid RCA     Social History     Substance and Sexual Activity   Alcohol Use Yes    Comment: occassionally      Social History     Substance and Sexual Activity   Drug Use Yes   • Types: Marijuana     Social History     Tobacco Use   Smoking Status Former   • Types: Cigarettes   Smokeless Tobacco Never       Allergies:  No Known Allergies    Medications:     Current Outpatient Medications:   •  amLODIPine (NORVASC) 5 mg tablet, Take 1 tablet (5 mg total) by mouth daily, Disp: 90 tablet, Rfl: 3  •  aspirin 81 mg chewable tablet, Chew 1 tablet (81 mg total) daily, Disp: 60 tablet, Rfl: 0  •  atorvastatin (LIPITOR) 80 mg tablet, Take 1 tablet (80 mg total) by mouth every evening, Disp: 180 tablet, Rfl: 3  •  clopidogrel (PLAVIX) 75 mg tablet, Take 1 tablet (75 mg total) by mouth daily, Disp: 30 tablet, Rfl: 11  •  lisinopril (ZESTRIL) 40 mg tablet, Take 1 tablet (40 mg total) by mouth daily, Disp: 90 tablet, Rfl: 3  •  metoprolol tartrate (LOPRESSOR) 50 mg tablet, Take 1 tablet (50 mg total) by mouth every 12 (twelve) hours, Disp: 180 tablet, Rfl: 3  •  NON FORMULARY, Medical marijuana, Disp: , Rfl:   •  spironolactone (ALDACTONE) 50 mg tablet, Take 1 tablet (50 mg total) by mouth daily, Disp: 90 tablet, Rfl: 3  •  nitroglycerin (NITROSTAT) 0 4 mg SL tablet, Place 1 tablet (0 4 mg total) under the tongue every 5 (five) minutes as needed for chest pain (Patient not taking: Reported on 8/18/2022), Disp: 24 tablet, Rfl: 1      Vitals:    01/25/23 0917   BP: (!) 174/92   Pulse: 59   SpO2: 99%     Weight (last 2 days)     Date/Time Weight    01/25/23 0917 95 7 (211)        Physical Exam  Constitutional:       General: He is not in acute distress  Appearance: He is not diaphoretic  HENT:      Head: Normocephalic and atraumatic  Eyes:      General: No scleral icterus  Conjunctiva/sclera: Conjunctivae normal    Neck:      Vascular: No JVD  Cardiovascular:      Rate and Rhythm: Normal rate and regular rhythm  Heart sounds: Normal heart sounds  No murmur heard  Pulmonary:      Effort: Pulmonary effort is normal  No respiratory distress  Breath sounds: Normal breath sounds  No decreased breath sounds, wheezing, rhonchi or rales  Musculoskeletal:      Cervical back: Normal range of motion  Right lower leg: Normal  No edema  Left lower leg: Normal  No edema  Skin:     General: Skin is warm and dry  Neurological:      Mental Status: He is alert and oriented to person, place, and time  Laboratory Studies:    Laboratory studies personally reviewed    Cardiac testing:     EKG reviewed personally:   No results found for this visit on 01/25/23        Echocardiogram:  3/22-EF normal, basal inferior hypokinesis     Stress tests:      Catheterization:  3/22-80% proximal RCA status post ITZEL, 90% mid RCA status post angioplasty, could not pass a stent, luminal irregularities elsewhere    Holter:         Noemi Ruggiero MD    Portions of the record may have been created with voice recognition software  Occasional wrong word or "sound a like" substitutions may have occurred due to the inherent limitations of voice recognition software  Read the chart carefully and recognize, using context, where substitutions have occurred

## 2023-02-21 ENCOUNTER — OFFICE VISIT (OUTPATIENT)
Dept: CARDIOLOGY CLINIC | Facility: CLINIC | Age: 63
End: 2023-02-21

## 2023-02-21 VITALS
HEART RATE: 55 BPM | OXYGEN SATURATION: 99 % | DIASTOLIC BLOOD PRESSURE: 76 MMHG | HEIGHT: 66 IN | WEIGHT: 213 LBS | SYSTOLIC BLOOD PRESSURE: 122 MMHG | BODY MASS INDEX: 34.23 KG/M2

## 2023-02-21 DIAGNOSIS — I10 PRIMARY HYPERTENSION: Primary | ICD-10-CM

## 2023-02-21 DIAGNOSIS — I21.19 ACUTE MI, INFERIOR WALL (HCC): ICD-10-CM

## 2023-02-21 RX ORDER — METOPROLOL SUCCINATE 50 MG/1
50 TABLET, EXTENDED RELEASE ORAL DAILY
Qty: 90 TABLET | Refills: 3 | Status: SHIPPED | OUTPATIENT
Start: 2023-02-21

## 2023-02-21 RX ORDER — CLOPIDOGREL BISULFATE 75 MG/1
75 TABLET ORAL DAILY
Qty: 30 TABLET | Refills: 1 | Status: SHIPPED | OUTPATIENT
Start: 2023-02-21

## 2023-02-21 NOTE — PATIENT INSTRUCTIONS
Blood pressure is excellent with the addition of spironolactone, at this point I do not think the metoprolol 50 twice a day as necessary, your heart rate today was 55  This particular medicine can be quite fatiguing    I think switching it to metoprolol succinate 50 mg once a day is in your best interest   I have made that change, but would just need you to check your blood pressure once a week for the next couple weeks to ensure that you are consistently less than 135/85

## 2023-02-21 NOTE — PROGRESS NOTES
[facility]  Follow up note  Karthik Valle 58 y o  male MRN: [id number]        Problems    1  Primary hypertension  metoprolol succinate (TOPROL-XL) 50 mg 24 hr tablet      2  Acute MI, inferior wall (HCC)  clopidogrel (PLAVIX) 75 mg tablet          Impression:    Peripheral vascular disease  Calcified lower extremities with no claudication  Coronary artery disease  Inferior STEMI 3/22, with drug-eluting stent to the proximal RCA and balloon angioplasty to the mid RCA, with inability to pass a stent to the mid vessel  He currently continues on dual antiplatelet therapy, he has no anginal symptoms, heart rate is well suppressed at 55  Will discontinue clopidogrel in April  Hypertension  Exceptional improvement with the addition of spironolactone, with normal follow-up BMP 1/30/2023 with a potassium of 4 2 in the presence of combination ACE inhibitor and spironolactone  Hyperlipidemia  Lipids are excellent  History of tobacco abuse    Plan:    Excellent response to spironolactone from a blood pressure standpoint, with exceptional blood pressure today  Bradycardic on metoprolol tartrate 50 mg twice a day, no angina  We will switch his metoprolol to metoprolol succinate 50 mg once a day  Home blood pressure checks once a week for the next couple weeks to ensure less than 135/85  He can discontinue clopidogrel in April, 2 more monthly refills were given  6-month follow-up recommended      HPI:   Karthik Valle is a 58y o  year old male with longstanding hypertension, mild hypercholesterolemia, presented with inferior STEMI 3/22, with drug-eluting stent to the proximal RCA, angioplasty only to the mid RCA due to difficulty passing the stent, and minimal disease elsewhere, with preserved left ventricular function on echo except for evidence of basal inferior/inferolateral hypokinesis presents for a follow-up visit        He is compliant with medications  Blood pressure not very well controlled with the addition of spironolactone, and despite combination with lisinopril, potassium was only 4 2  He continues on dual antiplatelet therapy with aspirin and clopidogrel, no significant bleeding, and completes a year of dual antiplatelet therapy in April  He has no cardiac complaints for me today  Lipids are well controlled        Review of Systems   Constitutional: Negative for appetite change, diaphoresis, fatigue and fever  Respiratory: Negative for chest tightness, shortness of breath and wheezing  Cardiovascular: Negative for chest pain, palpitations and leg swelling  Gastrointestinal: Negative for abdominal pain and blood in stool  Musculoskeletal: Negative for arthralgias and joint swelling  Skin: Negative for rash  Neurological: Negative for dizziness, syncope and light-headedness         Past Medical History:   Diagnosis Date   • Arthritis    • Coronary artery disease    • History of tobacco abuse    • Hyperlipidemia    • Hypertension    • STEMI involving right coronary artery ([facility] [address] ) 03/2022    S/p ITZEL to prox RCA and POBA to mid RCA     Social History     Substance and Sexual Activity   Alcohol Use Yes    Comment: occassionally      Social History     Substance and Sexual Activity   Drug Use Yes   • Types: Marijuana     Social History     Tobacco Use   Smoking Status Former   • Types: Cigarettes   Smokeless Tobacco Never       Allergies:  No Known Allergies    Medications:     Current Outpatient Medications:   •  amLODIPine (NORVASC) 5 mg tablet, Take 1 tablet (5 mg total) by mouth daily, Disp: 90 tablet, Rfl: 3  •  aspirin 81 mg chewable tablet, Chew 1 tablet (81 mg total) daily, Disp: 60 tablet, Rfl: 0  •  atorvastatin (LIPITOR) 80 mg tablet, Take 1 tablet (80 mg total) by mouth every evening, Disp: 180 tablet, Rfl: 3  •  clopidogrel (PLAVIX) 75 mg tablet, Take 1 tablet (75 mg total) by mouth daily, Disp: 30 tablet, Rfl: 1  •  lisinopril (ZESTRIL) 40 mg tablet, Take 1 tablet (40 mg total) by mouth daily, Disp: 90 tablet, Rfl: 3  •  metoprolol succinate (TOPROL-XL) 50 mg 24 hr tablet, Take 1 tablet (50 mg total) by mouth daily, Disp: 90 tablet, Rfl: 3  •  NON FORMULARY, Medical marijuana, Disp: , Rfl:   •  spironolactone (ALDACTONE) 50 mg tablet, Take 1 tablet (50 mg total) by mouth daily, Disp: 90 tablet, Rfl: 3  •  nitroglycerin (NITROSTAT) 0 4 mg SL tablet, Place 1 tablet (0 4 mg total) under the tongue every 5 (five) minutes as needed for chest pain (Patient not taking: Reported on 8/18/2022), Disp: 24 tablet, Rfl: 1      Vitals:    02/21/23 0948   BP: 122/76   Pulse: 55   SpO2: 99%     Weight (last 2 days)     Date/Time Weight    02/21/23 0948 96 6 (213)        Physical Exam  Constitutional:       General: He is not in acute distress  Appearance: He is not diaphoretic  HENT:      Head: Normocephalic and atraumatic  Eyes:      General: No scleral icterus  Conjunctiva/sclera: Conjunctivae normal    Neck:      Vascular: No JVD  Cardiovascular:      Rate and Rhythm: Normal rate and regular rhythm  Heart sounds: Normal heart sounds  No murmur heard  Pulmonary:      Effort: Pulmonary effort is normal  No respiratory distress  Breath sounds: Normal breath sounds  No decreased breath sounds, wheezing, rhonchi or rales  Musculoskeletal:      Cervical back: Normal range of motion  Right lower leg: Normal  No edema  Left lower leg: Normal  No edema  Skin:     General: Skin is warm and dry  Neurological:      Mental Status: He is alert and oriented to person, place, and time  Laboratory Studies:    Labs personally reviewed    Cardiac testing:     EKG reviewed personally:   No results found for this visit on 02/21/23        Echocardiogram:  3/22-EF normal, basal inferior hypokinesis     Stress tests:      Catheterization:  3/22-80% proximal RCA status post ITZEL, 90% mid RCA status post angioplasty, could not pass a stent, luminal irregularities elsewhere    Holter: Kaia Jefferson MD    Portions of the record may have been created with voice recognition software  Occasional wrong word or "sound a like" substitutions may have occurred due to the inherent limitations of voice recognition software  Read the chart carefully and recognize, using context, where substitutions have occurred

## 2023-05-23 DIAGNOSIS — I10 PRIMARY HYPERTENSION: ICD-10-CM

## 2023-05-25 RX ORDER — LISINOPRIL 40 MG/1
40 TABLET ORAL DAILY
Qty: 90 TABLET | Refills: 3 | Status: SHIPPED | OUTPATIENT
Start: 2023-05-25

## 2023-11-15 DIAGNOSIS — I10 PRIMARY HYPERTENSION: ICD-10-CM

## 2023-11-15 RX ORDER — SPIRONOLACTONE 50 MG/1
50 TABLET, FILM COATED ORAL DAILY
Qty: 90 TABLET | Refills: 0 | Status: SHIPPED | OUTPATIENT
Start: 2023-11-15

## 2023-11-15 RX ORDER — AMLODIPINE BESYLATE 5 MG/1
5 TABLET ORAL DAILY
Qty: 90 TABLET | Refills: 0 | Status: SHIPPED | OUTPATIENT
Start: 2023-11-15

## 2023-11-15 RX ORDER — METOPROLOL SUCCINATE 50 MG/1
50 TABLET, EXTENDED RELEASE ORAL DAILY
Qty: 90 TABLET | Refills: 0 | Status: SHIPPED | OUTPATIENT
Start: 2023-11-15

## 2023-11-15 RX ORDER — LISINOPRIL 40 MG/1
40 TABLET ORAL DAILY
Qty: 90 TABLET | Refills: 0 | Status: SHIPPED | OUTPATIENT
Start: 2023-11-15

## 2024-02-14 DIAGNOSIS — I10 PRIMARY HYPERTENSION: ICD-10-CM

## 2024-02-16 DIAGNOSIS — I25.10 CORONARY ARTERY DISEASE INVOLVING NATIVE CORONARY ARTERY OF NATIVE HEART WITHOUT ANGINA PECTORIS: Primary | ICD-10-CM

## 2024-02-16 RX ORDER — LISINOPRIL 40 MG/1
40 TABLET ORAL DAILY
Qty: 30 TABLET | Refills: 0 | Status: SHIPPED | OUTPATIENT
Start: 2024-02-16

## 2024-02-16 RX ORDER — METOPROLOL SUCCINATE 50 MG/1
50 TABLET, EXTENDED RELEASE ORAL DAILY
Qty: 30 TABLET | Refills: 0 | Status: SHIPPED | OUTPATIENT
Start: 2024-02-16

## 2024-02-16 RX ORDER — AMLODIPINE BESYLATE 5 MG/1
5 TABLET ORAL DAILY
Qty: 90 TABLET | Refills: 0 | Status: SHIPPED | OUTPATIENT
Start: 2024-02-16

## 2024-02-16 RX ORDER — SPIRONOLACTONE 50 MG/1
50 TABLET, FILM COATED ORAL DAILY
Qty: 30 TABLET | Refills: 0 | Status: SHIPPED | OUTPATIENT
Start: 2024-02-16

## 2024-03-18 DIAGNOSIS — I10 PRIMARY HYPERTENSION: ICD-10-CM

## 2024-03-19 RX ORDER — SPIRONOLACTONE 50 MG/1
50 TABLET, FILM COATED ORAL DAILY
Qty: 30 TABLET | Refills: 0 | Status: SHIPPED | OUTPATIENT
Start: 2024-03-19

## 2024-03-19 RX ORDER — METOPROLOL SUCCINATE 50 MG/1
50 TABLET, EXTENDED RELEASE ORAL DAILY
Qty: 90 TABLET | Refills: 3 | Status: SHIPPED | OUTPATIENT
Start: 2024-03-19

## 2024-04-15 DIAGNOSIS — I10 PRIMARY HYPERTENSION: ICD-10-CM

## 2024-04-22 RX ORDER — SPIRONOLACTONE 50 MG/1
50 TABLET, FILM COATED ORAL DAILY
Qty: 30 TABLET | Refills: 0 | Status: SHIPPED | OUTPATIENT
Start: 2024-04-22

## 2024-04-22 NOTE — TELEPHONE ENCOUNTER
Patient has been noncompliant with blood work, blood work was ordered over 2 months ago.  Prescription for spironolactone will be provided for 30 more days, immediate blood work is requested.

## 2024-05-14 DIAGNOSIS — I10 PRIMARY HYPERTENSION: ICD-10-CM

## 2024-05-17 RX ORDER — AMLODIPINE BESYLATE 5 MG/1
5 TABLET ORAL DAILY
Qty: 90 TABLET | Refills: 0 | Status: SHIPPED | OUTPATIENT
Start: 2024-05-17

## 2024-05-17 RX ORDER — SPIRONOLACTONE 50 MG/1
50 TABLET, FILM COATED ORAL DAILY
Qty: 30 TABLET | Refills: 0 | Status: SHIPPED | OUTPATIENT
Start: 2024-05-17

## 2024-05-19 DIAGNOSIS — I10 PRIMARY HYPERTENSION: ICD-10-CM

## 2024-05-20 RX ORDER — AMLODIPINE BESYLATE 5 MG/1
5 TABLET ORAL DAILY
Qty: 90 TABLET | Refills: 0 | OUTPATIENT
Start: 2024-05-20

## 2024-06-18 DIAGNOSIS — I10 PRIMARY HYPERTENSION: ICD-10-CM

## 2024-06-20 DIAGNOSIS — I10 PRIMARY HYPERTENSION: ICD-10-CM

## 2024-06-25 RX ORDER — SPIRONOLACTONE 50 MG/1
50 TABLET, FILM COATED ORAL DAILY
Qty: 30 TABLET | Refills: 0 | Status: SHIPPED | OUTPATIENT
Start: 2024-06-25

## 2024-07-16 RX ORDER — LISINOPRIL 40 MG/1
40 TABLET ORAL DAILY
Qty: 90 TABLET | Refills: 3 | Status: SHIPPED | OUTPATIENT
Start: 2024-07-16

## 2024-07-16 RX ORDER — SPIRONOLACTONE 50 MG/1
50 TABLET, FILM COATED ORAL DAILY
Qty: 30 TABLET | Refills: 0 | Status: SHIPPED | OUTPATIENT
Start: 2024-07-16

## 2024-08-13 DIAGNOSIS — I10 PRIMARY HYPERTENSION: Primary | ICD-10-CM

## 2024-08-15 RX ORDER — SPIRONOLACTONE 50 MG/1
50 TABLET, FILM COATED ORAL DAILY
Qty: 30 TABLET | Refills: 0 | Status: SHIPPED | OUTPATIENT
Start: 2024-08-15

## 2024-10-17 ENCOUNTER — OFFICE VISIT (OUTPATIENT)
Dept: CARDIOLOGY CLINIC | Facility: CLINIC | Age: 64
End: 2024-10-17
Payer: COMMERCIAL

## 2024-10-17 VITALS
HEART RATE: 72 BPM | HEIGHT: 67 IN | SYSTOLIC BLOOD PRESSURE: 124 MMHG | DIASTOLIC BLOOD PRESSURE: 72 MMHG | WEIGHT: 203.7 LBS | BODY MASS INDEX: 31.97 KG/M2 | OXYGEN SATURATION: 99 %

## 2024-10-17 DIAGNOSIS — Z01.810 PREOP CARDIOVASCULAR EXAM: Primary | ICD-10-CM

## 2024-10-17 PROCEDURE — 99214 OFFICE O/P EST MOD 30 MIN: CPT | Performed by: INTERNAL MEDICINE

## 2024-10-17 RX ORDER — MUPIROCIN 20 MG/G
OINTMENT TOPICAL DAILY
COMMUNITY
Start: 2024-10-02

## 2024-10-17 NOTE — PATIENT INSTRUCTIONS
Recommendations:  Continue current medications.  Proceed with surgery.  F/U with Dr. Haskins 3/25.

## 2024-10-17 NOTE — PROGRESS NOTES
Cardiology   Mike Pugh 64 y.o. male MRN: [id number]        Impression:  Hip replacement - patient at acceptable cardiovascular risk to proceed with surgery.  CAD - stable.  HTN - controlled.  Dyslipidemia - on statin.    Recommendations:  Continue current medications.  Proceed with surgery.  F/U with Dr. Haskins 3/25.      HPI: Mike Pugh is a 64 y.o. year old male with CAD s/p STEMI 3/22 - PCI of RCA, HTN, dyslipidemia, echo 3/22 EF 65% no significant valvular abnormalities.  No chest pain, shortness of breath, or palpitations. Has significant hip pain. Here for pre-op risk stratification prior to L hip replacement.         Review of Systems   Constitutional: Negative.    HENT: Negative.     Eyes: Negative.    Respiratory:  Negative for chest tightness and shortness of breath.    Cardiovascular:  Negative for chest pain, palpitations and leg swelling.   Gastrointestinal: Negative.    Endocrine: Negative.    Genitourinary: Negative.    Musculoskeletal:  Positive for arthralgias.   Skin: Negative.    Allergic/Immunologic: Negative.    Neurological: Negative.    Hematological: Negative.    Psychiatric/Behavioral: Negative.     All other systems reviewed and are negative.        Past Medical History:   Diagnosis Date    Arthritis     Coronary artery disease     History of tobacco abuse     Hyperlipidemia     Hypertension     STEMI involving right coronary artery (HCC) 03/2022    S/p ITZEL to prox RCA and POBA to mid RCA     Past Surgical History:   Procedure Laterality Date    CARDIAC CATHETERIZATION N/A 3/2/2022    Procedure: Cardiac pci;  Surgeon: Jesus Simmons MD;  Location: [facility];  Service: Cardiology    CARDIAC CATHETERIZATION N/A 3/2/2022    Procedure: Cardiac Coronary Angiogram;  Surgeon: Jesus Simmons MD;  Location: [facility];  Service: Cardiology    TONSILLECTOMY       Social History     Substance and Sexual Activity   Alcohol Use Yes    Comment: occassionally       Social History     Substance and Sexual Activity   Drug Use Yes    Types: Marijuana     Social History     Tobacco Use   Smoking Status Former    Types: Cigarettes   Smokeless Tobacco Never     Family History   Problem Relation Age of Onset    Heart disease Father        Allergies:  No Known Allergies    Medications:     Current Outpatient Medications:     amLODIPine (NORVASC) 5 mg tablet, TAKE ONE TABLET BY MOUTH EVERY DAY, Disp: 90 tablet, Rfl: 0    aspirin 81 mg chewable tablet, Chew 1 tablet (81 mg total) daily, Disp: 60 tablet, Rfl: 0    atorvastatin (LIPITOR) 80 mg tablet, Take 1 tablet (80 mg total) by mouth every evening, Disp: 180 tablet, Rfl: 3    BEE POLLEN PO, Take 2 tablets by mouth daily, Disp: , Rfl:     lisinopril (ZESTRIL) 40 mg tablet, TAKE ONE TABLET BY MOUTH EVERY DAY, Disp: 90 tablet, Rfl: 3    metoprolol succinate (TOPROL-XL) 50 mg 24 hr tablet, TAKE ONE TABLET BY MOUTH EVERY DAY, Disp: 90 tablet, Rfl: 3    Misc Natural Products (WHITE WILLOW BARK PO), Use 2 tablets, Disp: , Rfl:     nitroglycerin (NITROSTAT) 0.4 mg SL tablet, Place 1 tablet (0.4 mg total) under the tongue every 5 (five) minutes as needed for chest pain, Disp: 24 tablet, Rfl: 1    NON FORMULARY, Medical marijuana, Disp: , Rfl:     spironolactone (ALDACTONE) 50 mg tablet, TAKE ONE TABLET BY MOUTH EVERY DAY, Disp: 30 tablet, Rfl: 0    mupirocin (BACTROBAN) 2 % ointment, Apply topically daily (Patient not taking: Reported on 10/17/2024), Disp: , Rfl:       Wt Readings from Last 3 Encounters:   10/17/24 92.4 kg (203 lb 11.2 oz)   02/21/23 96.6 kg (213 lb)   01/25/23 95.7 kg (211 lb)     Temp Readings from Last 3 Encounters:   03/04/22 99 °F (37.2 °C) (Oral)     BP Readings from Last 3 Encounters:   10/17/24 124/72   02/21/23 122/76   01/25/23 (!) 174/92     Pulse Readings from Last 3 Encounters:   10/17/24 72   02/21/23 55   01/25/23 59         Physical Exam  HENT:      Head: Atraumatic.      Mouth/Throat:      Mouth: Mucous  "membranes are moist.   Eyes:      Extraocular Movements: Extraocular movements intact.   Cardiovascular:      Rate and Rhythm: Normal rate and regular rhythm.      Heart sounds: Normal heart sounds.   Pulmonary:      Effort: Pulmonary effort is normal.      Breath sounds: Normal breath sounds.   Abdominal:      General: Abdomen is flat.   Musculoskeletal:         General: Normal range of motion.      Cervical back: Normal range of motion.   Skin:     General: Skin is warm.   Neurological:      General: No focal deficit present.      Mental Status: He is alert and oriented to person, place, and time.   Psychiatric:         Mood and Affect: Mood normal.         Behavior: Behavior normal.           Laboratory Studies:  CMP:  Lab Results   Component Value Date    K 4.6 10/07/2024     10/07/2024    CO2 26 10/07/2024    BUN 21 10/07/2024    CREATININE 0.93 10/07/2024    AST 12 10/07/2024    ALT 14 10/07/2024    EGFR 92 10/07/2024       Lipid Profile:   No results found for: \"CHOL\"  Lab Results   Component Value Date    HDL 45 03/02/2022     Lab Results   Component Value Date    LDLCALC 95 03/02/2022     Lab Results   Component Value Date    TRIG 96 03/02/2022       Cardiac testing:   EKG reviewed personally:   No results found for this or any previous visit.    No results found for this or any previous visit.    No results found for this or any previous visit.    No results found for this or any previous visit.          "

## (undated) DEVICE — GUIDEWIRE WHOLEY HI TORQUE INTERM MOD J .035 145CM

## (undated) DEVICE — GLIDESHEATH SLENDER STAINLESS STEEL KIT: Brand: GLIDESHEATH SLENDER

## (undated) DEVICE — XIENCE SKYPOINT™ EVEROLIMUS ELUTING CORONARY STENT SYSTEM 4.00 MM X 08 MM / RAPID-EXCHANGE
Type: IMPLANTABLE DEVICE | Site: CORONARY | Status: NON-FUNCTIONAL
Brand: XIENCE SKYPOINT™

## (undated) DEVICE — NC TREK CORONARY DILATATION CATHETER 4.0 MM X 15 MM / RAPID-EXCHANGE: Brand: NC TREK

## (undated) DEVICE — RADIFOCUS OPTITORQUE ANGIOGRAPHIC CATHETER: Brand: OPTITORQUE

## (undated) DEVICE — NC TREK CORONARY DILATATION CATHETER 3.5 MM X 15 MM / RAPID-EXCHANGE: Brand: NC TREK

## (undated) DEVICE — RUNTHROUGH NS EXTRA FLOPPY PTCA GUIDEWIRE: Brand: RUNTHROUGH

## (undated) DEVICE — GUIDELINER CATHETERS ARE INTENDED TO BE USED IN CONJUNCTION WITH GUIDE CATHETERS TO ACCESS DISCRETE REGIONS OF THE CORONARY AND/OR PERIPHERAL VASCULATURE, AND TO FACILITATE PLACEMENT OF INTERVENTIONAL DEVICES.: Brand: GUIDELINER® V3 CATHETER

## (undated) DEVICE — XIENCE SKYPOINT™ EVEROLIMUS ELUTING CORONARY STENT SYSTEM 4.00 MM X 18 MM / RAPID-EXCHANGE
Type: IMPLANTABLE DEVICE | Site: CORONARY | Status: NON-FUNCTIONAL
Brand: XIENCE SKYPOINT™

## (undated) DEVICE — CATH GUIDE LAUNCHER 6FR JR 4.0

## (undated) DEVICE — DGW .035 FC J3MM 260CM TEF: Brand: EMERALD

## (undated) DEVICE — TREK CORONARY DILATATION CATHETER 3.0 MM X 15 MM / RAPID-EXCHANGE: Brand: TREK